# Patient Record
Sex: FEMALE | Race: WHITE | NOT HISPANIC OR LATINO | Employment: FULL TIME | URBAN - METROPOLITAN AREA
[De-identification: names, ages, dates, MRNs, and addresses within clinical notes are randomized per-mention and may not be internally consistent; named-entity substitution may affect disease eponyms.]

---

## 2023-03-13 ENCOUNTER — DOCUMENTATION ONLY (OUTPATIENT)
Dept: UROGYNECOLOGY | Facility: CLINIC | Age: 53
End: 2023-03-13

## 2023-03-30 ENCOUNTER — OFFICE VISIT (OUTPATIENT)
Dept: UROGYNECOLOGY | Facility: CLINIC | Age: 53
End: 2023-03-30
Payer: COMMERCIAL

## 2023-03-30 VITALS
HEIGHT: 64 IN | DIASTOLIC BLOOD PRESSURE: 92 MMHG | TEMPERATURE: 99 F | BODY MASS INDEX: 27.96 KG/M2 | HEART RATE: 65 BPM | WEIGHT: 163.81 LBS | RESPIRATION RATE: 18 BRPM | SYSTOLIC BLOOD PRESSURE: 134 MMHG

## 2023-03-30 DIAGNOSIS — N39.3 STRESS INCONTINENCE: ICD-10-CM

## 2023-03-30 DIAGNOSIS — N95.2 VAGINAL ATROPHY: ICD-10-CM

## 2023-03-30 DIAGNOSIS — Z96.0 HISTORY OF PUBOVAGINAL SLING: ICD-10-CM

## 2023-03-30 DIAGNOSIS — Z01.818 PREOP TESTING: ICD-10-CM

## 2023-03-30 DIAGNOSIS — N39.41 URGE INCONTINENCE: ICD-10-CM

## 2023-03-30 DIAGNOSIS — N81.9 FEMALE GENITAL PROLAPSE, UNSPECIFIED TYPE: ICD-10-CM

## 2023-03-30 DIAGNOSIS — R32 INCONTINENCE IN FEMALE: Primary | ICD-10-CM

## 2023-03-30 PROCEDURE — 3080F PR MOST RECENT DIASTOLIC BLOOD PRESSURE >= 90 MM HG: ICD-10-PCS | Mod: CPTII,S$GLB,, | Performed by: OBSTETRICS & GYNECOLOGY

## 2023-03-30 PROCEDURE — 3008F BODY MASS INDEX DOCD: CPT | Mod: CPTII,S$GLB,, | Performed by: OBSTETRICS & GYNECOLOGY

## 2023-03-30 PROCEDURE — 3080F DIAST BP >= 90 MM HG: CPT | Mod: CPTII,S$GLB,, | Performed by: OBSTETRICS & GYNECOLOGY

## 2023-03-30 PROCEDURE — 3075F PR MOST RECENT SYSTOLIC BLOOD PRESS GE 130-139MM HG: ICD-10-PCS | Mod: CPTII,S$GLB,, | Performed by: OBSTETRICS & GYNECOLOGY

## 2023-03-30 PROCEDURE — 99205 OFFICE O/P NEW HI 60 MIN: CPT | Mod: S$GLB,,, | Performed by: OBSTETRICS & GYNECOLOGY

## 2023-03-30 PROCEDURE — 3075F SYST BP GE 130 - 139MM HG: CPT | Mod: CPTII,S$GLB,, | Performed by: OBSTETRICS & GYNECOLOGY

## 2023-03-30 PROCEDURE — 99205 PR OFFICE/OUTPT VISIT, NEW, LEVL V, 60-74 MIN: ICD-10-PCS | Mod: S$GLB,,, | Performed by: OBSTETRICS & GYNECOLOGY

## 2023-03-30 PROCEDURE — 3008F PR BODY MASS INDEX (BMI) DOCUMENTED: ICD-10-PCS | Mod: CPTII,S$GLB,, | Performed by: OBSTETRICS & GYNECOLOGY

## 2023-03-30 NOTE — PROGRESS NOTES
PELVIC MEDICINE AND RECONSTRUCTIVE SURGERY CONSULT NOTE    CHIEF COMPLAINT:  Consultation requested regarding incontinence    HISTORY OF PRESENT ILLNESS:   Carol Das is a 53 y.o. female  Patient reports first noticing symptoms about 2-3 years ago.      She had a Sling placed in 2006 at the same time as her hysterectomy.  This was done with Dr. Ye in [place].  About 2-3 yrs ago she began having leakage again  When she lifts her leg, urine leaks.      Voids during the day: q1-3h  Voids at nighttime: 3-4  Leakage of urine with coughing or exercise: Yes  -previous surgery? Yes - Sling 2006  Leakage of urine with urgency: Yes - getting more and more.  Not much time from urgency to leak  Pad for leakage of urine:no   -how often do you change your pad? N/A  Sensation of incomplete emptying: No  Splinting to void/BM: No  History of kidney stones No  Hematuria No  > 3 UTIs in 12 months  No  - symptoms with UTI? N/A    Glasses/ounces of water in 1 day: 2 liters  Cups of coffee/tea/soda in 1 day: 1 coffee per week, one tea, one soda   Bowel movements in 1 week: 1-2  Constipation/straining: yes  Fecal incontinence: No  Fecal urgency: No  Fecal smearing: No    Patient  denies symptoms of a vaginal bulge.  Size of the bulge: N/A  Bulge becoming progressively worse over time: not applicable  Bulge limits physical activity: not applicable    Tried a pessary: no  Tried Kegels: No  Prior Surgery for prolapse: No  Prior Surgery for incontinence: Yes - Sling 2006    Sexually active: yes  Pain with sex: no  Vaginal dryness: yes  Loss of urine or stool with sexual activity:no    She reports history of vaginal bleeding - pre hyst  Patient also reports history of abdominal or pelvic pain.    Patient denies back injury or back pain  Patient denies lower extremity numbness, tingling      Gyn Hx:  Patient reports h/o Normal paps; denies h/o Fibroids, reports h/o Endometriosis, reports h/o Ovarian Cysts, denies h/o abnormal  paps, denies h/o LEEP/Cryo  Menopause age: 2006 Hysterectomy/Sling  No LMP recorded. Patient has had a hysterectomy.   V4  Obstetric complications? Yes - blood clots  Vaginal births? yes  -use of forceps or vacuum? no    OB History    Para Term  AB Living   4 4 4 0 0 4   SAB IAB Ectopic Multiple Live Births   0 0 0 0 4     Review of patient's allergies indicates:  No Known Allergies     Current Outpatient Medications:     albuterol (PROVENTIL/VENTOLIN HFA) 90 mcg/actuation inhaler, Inhale 2 puffs into the lungs every 4 (four) hours as needed., Disp: , Rfl:     levothyroxine (SYNTHROID) 112 MCG tablet, Take 112 mcg by mouth every morning., Disp: , Rfl:     liothyronine (CYTOMEL) 5 MCG Tab, Take 10 mcg by mouth., Disp: , Rfl:     venlafaxine (EFFEXOR-XR) 150 MG Cp24, Take 150 mg by mouth once daily., Disp: , Rfl:     Past Medical History:   Diagnosis Date    Thyroid disease     Urinary tract infection, site not specified      Past Surgical History:   Procedure Laterality Date    BLADDER REPAIR - Sling N/A 2006    HYSTERECTOMY  2006       Family History   Problem Relation Age of Onset    Asthma Mother     Arthritis Mother     Hypertension Father     Asthma Sister     Depression Sister     Mental illness Sister         Social History     Tobacco Use    Smoking status: Never    Smokeless tobacco: Never   Substance Use Topics    Alcohol use: Not Currently    Drug use: Never        Review of Systems   Psychological ROS: negative  Eyes: Negative  ENT ROS: Negative  Neuro ROS: Negative  Respiratory ROS: Negative  Gastrointestinal ROS: Negative  Cardiovascular ROS: Negative  Genitourinary ROS: Negative, Frequent urination, Difficulty emptying bladder, Leaking of urine, Difficulty starting stream - cannot stop mid stream, and Waking to urinate  Integumentary ROS: Negative  Musculoskeletal ROS: Negative      Physical Exam:   BP (!) 134/92 (BP Location: Left arm, Patient Position: Sitting, BP Method: Medium  "(Automatic))   Pulse 65   Temp 99 °F (37.2 °C) (Oral)   Resp 18   Ht 5' 4" (1.626 m)   Wt 74.3 kg (163 lb 12.8 oz)   BMI 28.12 kg/m²   General: No acute distress   Skin: no lesions  Musculoskeletal: normal lower extremity strength  Sensation to light touch in the lower extremities is normal   Extremities: well perfused with normal pulses in the distal extremities, no peripheral edema noted  Abdominal exam: soft non tender, no palpable masses  External genitalia: normal female genitalia, no lesions, normal female hair distribution, no clitoral enlargement, nontender, no scars  The perineal reflexes are present  Exam Chaperoned by: Fay Clements LPN  Vagina/cervix: atrophic vagina, no discharge, exudate, lesion, or erythema  Bimanual exam: Surgically absent, no palpable masses, non-tender exam  Urethra: no prolapse, caruncle, absent  Stress test: positive, + Valsalva    POPQ (Date 2023): Aa -1.5 Ba -1.5  C -5  GH 4 PB 2 TVL 9.5 Ap -1.5 Bp -1.5 D N/A        ASSESSMENT:  Carol Das is a 53 y.o.    1. Incontinence in female    2. Urge incontinence    3. Stress incontinence    4. History of pubovaginal sling    5. Female genital prolapse, unspecified type    6. Vaginal atrophy          PLAN:  The pathophysiology of the above condition has been discussed with the patient who expressed understanding.   We discussed the differences between stress and urge incontinence.    Urge urinary incontinence - We reviewed management options for Urge Incontinence including expectant management, lifestyle and behavioral modifications, Kegel's, PFPT, medications and more invasive treatments such as PTNS, Botox, and Interstim. We reviewed in great detail the success and failure rate associated with each approach. For Botox we discussed complication rates such as voiding dysfunction, need for self catheterization and, more rare complications of muscle weakness remote from Botox injection.  At this time, " YUSRA>>UUI    Stress urinary incontinence - We reviewed management options for Stress Incontinence including expectant management, lifestyle and behavioral modifications, weight loss, Kegel's, PFPT, Poise incontinence inserts, pessaries, and more invasive treatments such as mid-urethral and fascial slings. We reviewed in great detail the success and failure rate associated with each approach.  For slings, we discussed complication rates like mesh erosion, post op urinary retention, voiding dysfunction, pain, infection, bleeding and the expected recovery after a sling procedure.  Patient leaked with Valsalva.  She also leaks with moving her leg into certain positions.  She lifts her leg and she leaks.  She has a h/o Sling in 2006.  Most likely the sling is tethering and opening the urethra.  The sling is also not functioning any longer.  We were unable to get the operative note but per her description it is most likely a retropubic sling.  Would need to release the sling to relieve the tethering and offer urethral bulking for YUSRA.  Will schedule date for intervention at next visit.  She will need pre-op cysto/UDT for previous sling with urethral tethering and mixed incontinence.     Pelvic Organ Prolapse - the patient was counseled regarding the pathophysiology of prolapse.  The patient was counseled regarding the possible natural progression of prolapse and the clinical consequences of worsening prolapse.  We discussed that in most cases prolapse is not harmful but can negatively affect her quality of life.  She was counseled regarding management strategies including: expectant management, pelvic floor physical therapy, weight loss, Kegel's, avoidance of constipation and heavy lifting, and pessary placement.  Surgical management also reviewed.  Cure from surgical intervention is not guaranteed and there is risk of occult stress incontinence.  She verbalized understanding and desires to pursue PFPT.  Prolapse is Stage  1.  Conservative management/PFPT recommended.  Prolapse is not bothersome.    Vaginal atrophy - Encouraged use of vaginal estrogen as it will restore some elasticity to the vagina and has been shown to decrease the rate of recurrent UTIs, decrease vaginal pH, and increase vaginal lactobacillus.  A pea-sized amount to the vagina every night for 14 days, then 2-3 times per week.  Handout given on low dose vaginal estrogen.      At the time the patient desires to proceed:  PFPT  Sling release and urethral bulking  Vaginal estrogen    Handouts provided on above diagnosis    Follow up 6-8 weeks    Orders Placed This Encounter    Urinalysis, Reflex to Urine Culture    estradioL (ESTRACE) 0.01 % (0.1 mg/gram) vaginal cream         WILLIAM Barrow MD  Pelvic Medicine and Reconstructive Surgery  Urogynecology  [facility]      60 minutes was spent face to face with patient >50% of the time was spent in counseling and coordination of care.

## 2023-04-10 RX ORDER — ESTRADIOL 0.1 MG/G
CREAM VAGINAL
Qty: 42.5 G | Refills: 10 | Status: SHIPPED | OUTPATIENT
Start: 2023-04-10 | End: 2023-06-12 | Stop reason: SDUPTHER

## 2023-05-11 ENCOUNTER — OFFICE VISIT (OUTPATIENT)
Dept: UROGYNECOLOGY | Facility: CLINIC | Age: 53
End: 2023-05-11
Payer: COMMERCIAL

## 2023-05-11 DIAGNOSIS — N39.3 STRESS INCONTINENCE: ICD-10-CM

## 2023-05-11 DIAGNOSIS — N95.2 VAGINAL ATROPHY: ICD-10-CM

## 2023-05-11 DIAGNOSIS — N81.9 FEMALE GENITAL PROLAPSE, UNSPECIFIED TYPE: ICD-10-CM

## 2023-05-11 DIAGNOSIS — N39.41 URGE INCONTINENCE: ICD-10-CM

## 2023-05-11 DIAGNOSIS — R32 INCONTINENCE IN FEMALE: Primary | ICD-10-CM

## 2023-05-11 DIAGNOSIS — Z96.0 HISTORY OF PUBOVAGINAL SLING: ICD-10-CM

## 2023-05-11 PROCEDURE — 1159F MED LIST DOCD IN RCRD: CPT | Mod: CPTII,S$GLB,, | Performed by: OBSTETRICS & GYNECOLOGY

## 2023-05-11 PROCEDURE — 52000 CYSTOURETHROSCOPY: CPT | Mod: S$GLB,,, | Performed by: OBSTETRICS & GYNECOLOGY

## 2023-05-11 PROCEDURE — 99499 NO LOS: ICD-10-PCS | Mod: S$GLB,,, | Performed by: OBSTETRICS & GYNECOLOGY

## 2023-05-11 PROCEDURE — 1159F PR MEDICATION LIST DOCUMENTED IN MEDICAL RECORD: ICD-10-PCS | Mod: CPTII,S$GLB,, | Performed by: OBSTETRICS & GYNECOLOGY

## 2023-05-11 PROCEDURE — 99499 UNLISTED E&M SERVICE: CPT | Mod: S$GLB,,, | Performed by: OBSTETRICS & GYNECOLOGY

## 2023-05-11 PROCEDURE — 52000 PR CYSTOURETHROSCOPY: ICD-10-PCS | Mod: S$GLB,,, | Performed by: OBSTETRICS & GYNECOLOGY

## 2023-05-12 RX ORDER — SULFAMETHOXAZOLE AND TRIMETHOPRIM 800; 160 MG/1; MG/1
1 TABLET ORAL 2 TIMES DAILY
Qty: 3 TABLET | Refills: 0 | Status: SHIPPED | OUTPATIENT
Start: 2023-05-12 | End: 2023-05-14

## 2023-05-17 RX ORDER — LIDOCAINE HYDROCHLORIDE 20 MG/ML
JELLY TOPICAL
Status: DISCONTINUED | OUTPATIENT
Start: 2023-05-17 | End: 2023-05-20 | Stop reason: CLARIF

## 2023-05-17 NOTE — PROCEDURES
Cystoscopy     Brief Procedure Note  Name:  Carol Das  MRN: [id number]  Age: 53 y.o.   YOB: 1970  Gender: female      Procedure Date:  05/17/2023      Location:  Clinic     Pre Procedure Diagnosis:  1. Incontinence in female    2. Urge incontinence    3. Stress incontinence    4. History of pubovaginal sling    5. Female genital prolapse, unspecified type    6. Vaginal atrophy         Post-Op Diagnosis:  same    Procedure:  Flexible cystoscopy    Surgeon:   Jailyn Barrow MD    Anesthesia Type:  2% Lidocaine jelly (Urojet)    Findings:  Ureteral orifices in normal orthotopic position, no trabeculations, no tumors, no mucosal lesions, no diverticula seen.  Normal urethra and bladder.    Estimated Blood Loss:  0 ml    Fluids:  Normal saline    Complications: None    Condition:  stable    Disposition:  home    Procedure in Detail::  Patient was placed on table in dorsal lithotomy position and prepped and draped in normal sterile fashion.  A well lubricated 16 Pashto flexible cystoscope was inserted into urethral meatus and advanced into the bladder.  Care was taken to keep the lumen in the center of view.  In the urethra there were no strictures, mucosal lesions, tumors, or polyps.  The bladder was then partially filled and evaluated in a pandescopic fashion. The bilateral ureteral orifices were in normal orthotopic position. There were no mucosal lesions, trabeculations, diverticula, stones, or tumors. Overall the bladder appeared normal.   Retroflexed view showed normal urothelium at the bladder neck.  The bladder was then drained and the results briefly discussed with patient.  All parts of the cystoscopic sheath and instruments were intact removed from the patient.  Patient tolerated procedure well.  All instrument, and sponge counts were correct.  Patient was allowed to recover in the room.    WILLIAM Barrow MD  Pelvic Medicine and Reconstructive  Surgery  Urogynecology      Cystoscopy    Date/Time: 5/11/2023 3:20 PM  Performed by: Jailyn Barrow MD  Authorized by: Jailyn Barrow MD     Consent Done?:  Yes (Verbal)  Timeout: prior to procedure the correct patient, procedure, and site was verified    Prep: patient was prepped and draped in usual sterile fashion    Local anesthesia used?: Yes    Anesthesia:  Intraurethral instillation  Local anesthetic:  Topical anesthetic  Anesthetic total (ml):  5  Indications: incontinence and overactive bladder    Position:  Dorsal lithotomy  Anesthesia:  Intraurethral instillation  Patient sedated?: No    Preparation: Patient was prepped and draped in usual sterile fashion    Scope type:  Flexible cystoscope  External exam normal: Yes    Urethra normal: Yes    Bladder neck normal: Yes    Bladder normal: Yes     patient tolerated the procedure well with no immediate complications

## 2023-05-28 ENCOUNTER — PATIENT MESSAGE (OUTPATIENT)
Dept: ADMINISTRATIVE | Facility: OTHER | Age: 53
End: 2023-05-28
Payer: COMMERCIAL

## 2023-05-29 ENCOUNTER — PATIENT MESSAGE (OUTPATIENT)
Dept: ADMINISTRATIVE | Facility: OTHER | Age: 53
End: 2023-05-29
Payer: COMMERCIAL

## 2023-05-30 ENCOUNTER — HOSPITAL ENCOUNTER (OUTPATIENT)
Dept: RADIOLOGY | Facility: HOSPITAL | Age: 53
Discharge: HOME | End: 2023-05-30
Attending: OBSTETRICS & GYNECOLOGY
Payer: COMMERCIAL

## 2023-05-30 ENCOUNTER — ANESTHESIA EVENT (OUTPATIENT)
Dept: SURGERY | Facility: HOSPITAL | Age: 53
End: 2023-05-30
Payer: COMMERCIAL

## 2023-05-30 ENCOUNTER — OFFICE VISIT (OUTPATIENT)
Dept: UROGYNECOLOGY | Facility: CLINIC | Age: 53
End: 2023-05-30
Payer: COMMERCIAL

## 2023-05-30 VITALS — SYSTOLIC BLOOD PRESSURE: 128 MMHG | DIASTOLIC BLOOD PRESSURE: 88 MMHG | HEART RATE: 68 BPM

## 2023-05-30 DIAGNOSIS — R32 INCONTINENCE IN FEMALE: ICD-10-CM

## 2023-05-30 DIAGNOSIS — N39.3 STRESS INCONTINENCE: ICD-10-CM

## 2023-05-30 DIAGNOSIS — Z96.0 HISTORY OF PUBOVAGINAL SLING: ICD-10-CM

## 2023-05-30 DIAGNOSIS — N39.3 STRESS INCONTINENCE: Primary | ICD-10-CM

## 2023-05-30 DIAGNOSIS — N81.9 FEMALE GENITAL PROLAPSE, UNSPECIFIED TYPE: ICD-10-CM

## 2023-05-30 DIAGNOSIS — N95.2 VAGINAL ATROPHY: ICD-10-CM

## 2023-05-30 DIAGNOSIS — Z01.818 PREOP TESTING: ICD-10-CM

## 2023-05-30 PROCEDURE — 3074F PR MOST RECENT SYSTOLIC BLOOD PRESSURE < 130 MM HG: ICD-10-PCS | Mod: CPTII,S$GLB,, | Performed by: OBSTETRICS & GYNECOLOGY

## 2023-05-30 PROCEDURE — 51728 CYSTOMETROGRAM W/VP: CPT | Mod: 26,,, | Performed by: OBSTETRICS & GYNECOLOGY

## 2023-05-30 PROCEDURE — 3074F SYST BP LT 130 MM HG: CPT | Mod: CPTII,S$GLB,, | Performed by: OBSTETRICS & GYNECOLOGY

## 2023-05-30 PROCEDURE — 71046 X-RAY EXAM CHEST 2 VIEWS: CPT | Mod: TC

## 2023-05-30 PROCEDURE — 99499 NO LOS: ICD-10-PCS | Mod: S$GLB,,, | Performed by: OBSTETRICS & GYNECOLOGY

## 2023-05-30 PROCEDURE — 51784 PR ANAL/URINARY MUSCLE STUDY: ICD-10-PCS | Mod: 26,,, | Performed by: OBSTETRICS & GYNECOLOGY

## 2023-05-30 PROCEDURE — 51797 PR VOIDING PRESS STUDY INTRA-ABDOMINAL VOID: ICD-10-PCS | Mod: S$GLB,,, | Performed by: OBSTETRICS & GYNECOLOGY

## 2023-05-30 PROCEDURE — 51784 ANAL/URINARY MUSCLE STUDY: CPT | Mod: 26,,, | Performed by: OBSTETRICS & GYNECOLOGY

## 2023-05-30 PROCEDURE — 3079F PR MOST RECENT DIASTOLIC BLOOD PRESSURE 80-89 MM HG: ICD-10-PCS | Mod: CPTII,S$GLB,, | Performed by: OBSTETRICS & GYNECOLOGY

## 2023-05-30 PROCEDURE — 51741 ELECTRO-UROFLOWMETRY FIRST: CPT | Mod: S$GLB,,, | Performed by: OBSTETRICS & GYNECOLOGY

## 2023-05-30 PROCEDURE — 3079F DIAST BP 80-89 MM HG: CPT | Mod: CPTII,S$GLB,, | Performed by: OBSTETRICS & GYNECOLOGY

## 2023-05-30 PROCEDURE — 51797 INTRAABDOMINAL PRESSURE TEST: CPT | Mod: S$GLB,,, | Performed by: OBSTETRICS & GYNECOLOGY

## 2023-05-30 PROCEDURE — 99499 UNLISTED E&M SERVICE: CPT | Mod: S$GLB,,, | Performed by: OBSTETRICS & GYNECOLOGY

## 2023-05-30 PROCEDURE — 51728 PR COMPLEX CYSTOMETROGRAM VOIDING PRESSURE STUDIES: ICD-10-PCS | Mod: 26,,, | Performed by: OBSTETRICS & GYNECOLOGY

## 2023-05-30 PROCEDURE — 51741 PR UROFLOWMETRY, COMPLEX: ICD-10-PCS | Mod: S$GLB,,, | Performed by: OBSTETRICS & GYNECOLOGY

## 2023-05-30 RX ORDER — ACETAMINOPHEN 325 MG/1
1000 TABLET ORAL ONCE
Status: CANCELLED | OUTPATIENT
Start: 2023-05-30 | End: 2023-05-30

## 2023-05-30 RX ORDER — ONDANSETRON 2 MG/ML
4 INJECTION INTRAMUSCULAR; INTRAVENOUS
Status: CANCELLED | OUTPATIENT
Start: 2023-05-30 | End: 2023-05-30

## 2023-05-30 RX ORDER — PHENAZOPYRIDINE HYDROCHLORIDE 200 MG/1
200 TABLET, FILM COATED ORAL ONCE
Status: CANCELLED | OUTPATIENT
Start: 2023-05-30 | End: 2023-05-30

## 2023-05-30 RX ORDER — CELECOXIB 100 MG/1
400 CAPSULE ORAL ONCE
Status: CANCELLED | OUTPATIENT
Start: 2023-05-30 | End: 2023-05-30

## 2023-05-30 RX ORDER — ONDANSETRON 4 MG/1
4 TABLET, FILM COATED ORAL ONCE
Status: CANCELLED | OUTPATIENT
Start: 2023-05-30 | End: 2023-05-30

## 2023-05-30 RX ORDER — CELECOXIB 200 MG/1
200 CAPSULE ORAL
Status: CANCELLED | OUTPATIENT
Start: 2023-05-30 | End: 2023-05-30

## 2023-05-30 NOTE — H&P
PELVIC MEDICINE AND RECONSTRUCTIVE SURGERY H&P NOTE    CHIEF COMPLAINT:  Follow up regarding incontinence    HISTORY OF PRESENT ILLNESS:   Carol Das is a 53 y.o. female  Patient reports first noticing symptoms about 2-3 years ago.      She had a Sling placed in  at the same time as her hysterectomy.  This was done with Dr. Ye in [place].  About 2-3 yrs ago she began having leakage again  When she lifts her leg, urine leaks.      Voids during the day: q1-3h  Voids at nighttime: 3-4  Leakage of urine with coughing or exercise: Yes  -previous surgery? Yes - Sling   Leakage of urine with urgency: Yes - getting more and more.  Not much time from urgency to leak  Pad for leakage of urine:no   -how often do you change your pad? N/A  Sensation of incomplete emptying: No  Splinting to void/BM: No  History of kidney stones No  Hematuria No  > 3 UTIs in 12 months  No  - symptoms with UTI? N/A    Glasses/ounces of water in 1 day: 2 liters  Cups of coffee/tea/soda in 1 day: 1 coffee per week, one tea, one soda   Bowel movements in 1 week: 1-2  Constipation/straining: yes  Fecal incontinence: No  Fecal urgency: No  Fecal smearing: No    Patient  denies symptoms of a vaginal bulge.  Size of the bulge: N/A  Bulge becoming progressively worse over time: not applicable  Bulge limits physical activity: not applicable    Tried a pessary: no  Tried Kegels: No  Prior Surgery for prolapse: No  Prior Surgery for incontinence: Yes - Sling     Interim Hx:  Patient presents today for UDT and to sign consents    Gyn Hx:  Patient reports h/o Normal paps; denies h/o Fibroids, reports h/o Endometriosis, reports h/o Ovarian Cysts, denies h/o abnormal paps, denies h/o LEEP/Cryo  Menopause age: 2006 Hysterectomy/Sling  No LMP recorded. Patient has had a hysterectomy.   V4  Obstetric complications? Yes - blood clots  Vaginal births? yes  -use of forceps or vacuum? no    OB History    Para Term  AB Living    4 4 4 0 0 4   SAB IAB Ectopic Multiple Live Births   0 0 0 0 4     Review of patient's allergies indicates:  No Known Allergies     Current Outpatient Medications:     estradioL (ESTRACE) 0.01 % (0.1 mg/gram) vaginal cream, Use a pea sized amount to the vagina once a night for 14 nights when initiating the medication, then 2-3 times per week.  DO NOT use the applicator. (Patient not taking: Reported on 5/20/2023), Disp: 42.5 g, Rfl: 10    levothyroxine (SYNTHROID) 112 MCG tablet, Take 112 mcg by mouth every morning., Disp: , Rfl:     liothyronine (CYTOMEL) 5 MCG Tab, Take 10 mcg by mouth., Disp: , Rfl:     venlafaxine (EFFEXOR-XR) 150 MG Cp24, Take 150 mg by mouth once daily., Disp: , Rfl:     Past Medical History:   Diagnosis Date    Incontinence     MVP (mitral valve prolapse)     Thyroid disease     Urinary tract infection, site not specified      Past Surgical History:   Procedure Laterality Date    BLADDER REPAIR - Sling N/A 2006    HYSTERECTOMY  2006       Family History   Problem Relation Age of Onset    Asthma Mother     Arthritis Mother     Hypertension Father     Asthma Sister     Depression Sister     Mental illness Sister         Social History     Tobacco Use    Smoking status: Never    Smokeless tobacco: Never   Substance Use Topics    Alcohol use: Not Currently    Drug use: Never        Review of Systems   Psychological ROS: negative  Eyes: Negative  ENT ROS: Negative  Neuro ROS: Negative  Respiratory ROS: Negative  Gastrointestinal ROS: Negative  Cardiovascular ROS: Negative  Genitourinary ROS: Negative, Frequent urination, Difficulty emptying bladder, Leaking of urine, Difficulty starting stream - cannot stop mid stream, and Waking to urinate  Integumentary ROS: Negative  Musculoskeletal ROS: Negative      Physical Exam:   /88   Pulse 68   General: No acute distress   Skin: no lesions  Musculoskeletal: normal lower extremity strength  Chest: clear  CV: regular rate  Sensation to light touch  in the lower extremities is normal   Extremities: well perfused with normal pulses in the distal extremities, no peripheral edema noted  Abdominal exam: soft non tender, no palpable masses  External genitalia: normal female genitalia, no lesions, normal female hair distribution, no clitoral enlargement, nontender, no scars  The perineal reflexes are present  Exam Chaperoned by: Fay Clements LPN  Vagina/cervix: atrophic vagina, no discharge, exudate, lesion, or erythema  Bimanual exam: Surgically absent, no palpable masses, non-tender exam  Urethra: no prolapse, caruncle, absent  Stress test: positive, + Valsalva    POPQ (Date 2023): Aa -1.5 Ba -1.5  C -5  GH 4 PB 2 TVL 9.5 Ap -1.5 Bp -1.5 D N/A        ASSESSMENT:  Carol Das is a 53 y.o.    1. Stress incontinence    2. Incontinence in female    3. History of pubovaginal sling    4. Female genital prolapse, unspecified type    5. Vaginal atrophy          PLAN:  The pathophysiology of the above condition has been discussed with the patient who expressed understanding.   We discussed the differences between stress and urge incontinence.    Urge urinary incontinence - We reviewed management options for Urge Incontinence including expectant management, lifestyle and behavioral modifications, Kegel's, PFPT, medications and more invasive treatments such as PTNS, Botox, and Interstim. We reviewed in great detail the success and failure rate associated with each approach. For Botox we discussed complication rates such as voiding dysfunction, need for self catheterization and, more rare complications of muscle weakness remote from Botox injection.  At this time, YUSRA>>UUI.  -Bladder is stable.  No DO noted. Bladder biopsy in OR.  Small polyps noted on cystoscopy.    Stress urinary incontinence - We reviewed management options for Stress Incontinence including expectant management, lifestyle and behavioral modifications, weight loss, Kegel's, PFPT,  Poise incontinence inserts, pessaries, and more invasive treatments such as mid-urethral and fascial slings. We reviewed in great detail the success and failure rate associated with each approach.  For slings, we discussed complication rates like mesh erosion, post op urinary retention, voiding dysfunction, pain, infection, bleeding and the expected recovery after a sling procedure.  Patient leaked with Valsalva.  She also leaks with moving her leg into certain positions.  She lifts her leg and she leaks.  She has a h/o Sling in 2006.  Most likely the sling is tethering and opening the urethra.  The sling is also not functioning any longer.  We were unable to get the operative note but per her description it is most likely a retropubic sling.  Would need to release the sling to relieve the tethering and offer urethral bulking for YUSRA.  Will schedule date for intervention at next visit.  She will need pre-op cysto/UDT for previous sling with urethral tethering and mixed incontinence.     -Scheduled for urethral bulking.  Will release sling and remove segment under the urethra.  Patient counseled on the risks, benefits, and alternatives to surgery, including:  Bleeding, infection, damage to surrounding organs, including the bladder and urethra.  Patient counseled on the risk of injury to major vascular structures.  Patient counseled on the risk of conversion to open, and/or other procedures as necessary.  Patient consents to the use of blood products if it is to save her life.  Patient accepts these risks and wishes to proceed with surgery.    Pelvic Organ Prolapse - the patient was counseled regarding the pathophysiology of prolapse.  The patient was counseled regarding the possible natural progression of prolapse and the clinical consequences of worsening prolapse.  We discussed that in most cases prolapse is not harmful but can negatively affect her quality of life.  She was counseled regarding management  strategies including: expectant management, pelvic floor physical therapy, weight loss, Kegel's, avoidance of constipation and heavy lifting, and pessary placement.  Surgical management also reviewed.  Cure from surgical intervention is not guaranteed and there is risk of occult stress incontinence.  She verbalized understanding and desires to pursue PFPT.  Prolapse is Stage 1.  Conservative management/PFPT recommended.  Prolapse is not bothersome.    Vaginal atrophy - Encouraged use of vaginal estrogen as it will restore some elasticity to the vagina and has been shown to decrease the rate of recurrent UTIs, decrease vaginal pH, and increase vaginal lactobacillus.  A pea-sized amount to the vagina every night for 14 days, then 2-3 times per week.  Handout given on low dose vaginal estrogen.      At the time the patient desires to proceed:  PFPT  Sling release and urethral bulking  Continue Vaginal estrogen      Follow up post-op    WILLIAM Barrow MD  Pelvic Medicine and Reconstructive Surgery  Urogynecology  [facility]    80 minutes was spent face to face with patient >50% of the time was spent in counseling and coordination of care.

## 2023-05-30 NOTE — H&P (VIEW-ONLY)
PELVIC MEDICINE AND RECONSTRUCTIVE SURGERY H&P NOTE    CHIEF COMPLAINT:  Follow up regarding incontinence    HISTORY OF PRESENT ILLNESS:   Carol Das is a 53 y.o. female  Patient reports first noticing symptoms about 2-3 years ago.      She had a Sling placed in  at the same time as her hysterectomy.  This was done with Dr. Ye in [place].  About 2-3 yrs ago she began having leakage again  When she lifts her leg, urine leaks.      Voids during the day: q1-3h  Voids at nighttime: 3-4  Leakage of urine with coughing or exercise: Yes  -previous surgery? Yes - Sling   Leakage of urine with urgency: Yes - getting more and more.  Not much time from urgency to leak  Pad for leakage of urine:no   -how often do you change your pad? N/A  Sensation of incomplete emptying: No  Splinting to void/BM: No  History of kidney stones No  Hematuria No  > 3 UTIs in 12 months  No  - symptoms with UTI? N/A    Glasses/ounces of water in 1 day: 2 liters  Cups of coffee/tea/soda in 1 day: 1 coffee per week, one tea, one soda   Bowel movements in 1 week: 1-2  Constipation/straining: yes  Fecal incontinence: No  Fecal urgency: No  Fecal smearing: No    Patient  denies symptoms of a vaginal bulge.  Size of the bulge: N/A  Bulge becoming progressively worse over time: not applicable  Bulge limits physical activity: not applicable    Tried a pessary: no  Tried Kegels: No  Prior Surgery for prolapse: No  Prior Surgery for incontinence: Yes - Sling     Interim Hx:  Patient presents today for UDT and to sign consents    Gyn Hx:  Patient reports h/o Normal paps; denies h/o Fibroids, reports h/o Endometriosis, reports h/o Ovarian Cysts, denies h/o abnormal paps, denies h/o LEEP/Cryo  Menopause age: 2006 Hysterectomy/Sling  No LMP recorded. Patient has had a hysterectomy.   V4  Obstetric complications? Yes - blood clots  Vaginal births? yes  -use of forceps or vacuum? no    OB History    Para Term  AB Living    4 4 4 0 0 4   SAB IAB Ectopic Multiple Live Births   0 0 0 0 4     Review of patient's allergies indicates:  No Known Allergies     Current Outpatient Medications:     estradioL (ESTRACE) 0.01 % (0.1 mg/gram) vaginal cream, Use a pea sized amount to the vagina once a night for 14 nights when initiating the medication, then 2-3 times per week.  DO NOT use the applicator. (Patient not taking: Reported on 5/20/2023), Disp: 42.5 g, Rfl: 10    levothyroxine (SYNTHROID) 112 MCG tablet, Take 112 mcg by mouth every morning., Disp: , Rfl:     liothyronine (CYTOMEL) 5 MCG Tab, Take 10 mcg by mouth., Disp: , Rfl:     venlafaxine (EFFEXOR-XR) 150 MG Cp24, Take 150 mg by mouth once daily., Disp: , Rfl:     Past Medical History:   Diagnosis Date    Incontinence     MVP (mitral valve prolapse)     Thyroid disease     Urinary tract infection, site not specified      Past Surgical History:   Procedure Laterality Date    BLADDER REPAIR - Sling N/A 2006    HYSTERECTOMY  2006       Family History   Problem Relation Age of Onset    Asthma Mother     Arthritis Mother     Hypertension Father     Asthma Sister     Depression Sister     Mental illness Sister         Social History     Tobacco Use    Smoking status: Never    Smokeless tobacco: Never   Substance Use Topics    Alcohol use: Not Currently    Drug use: Never        Review of Systems   Psychological ROS: negative  Eyes: Negative  ENT ROS: Negative  Neuro ROS: Negative  Respiratory ROS: Negative  Gastrointestinal ROS: Negative  Cardiovascular ROS: Negative  Genitourinary ROS: Negative, Frequent urination, Difficulty emptying bladder, Leaking of urine, Difficulty starting stream - cannot stop mid stream, and Waking to urinate  Integumentary ROS: Negative  Musculoskeletal ROS: Negative      Physical Exam:   /88   Pulse 68   General: No acute distress   Skin: no lesions  Musculoskeletal: normal lower extremity strength  Chest: clear  CV: regular rate  Sensation to light touch  in the lower extremities is normal   Extremities: well perfused with normal pulses in the distal extremities, no peripheral edema noted  Abdominal exam: soft non tender, no palpable masses  External genitalia: normal female genitalia, no lesions, normal female hair distribution, no clitoral enlargement, nontender, no scars  The perineal reflexes are present  Exam Chaperoned by: Fay Clements LPN  Vagina/cervix: atrophic vagina, no discharge, exudate, lesion, or erythema  Bimanual exam: Surgically absent, no palpable masses, non-tender exam  Urethra: no prolapse, caruncle, absent  Stress test: positive, + Valsalva    POPQ (Date 2023): Aa -1.5 Ba -1.5  C -5  GH 4 PB 2 TVL 9.5 Ap -1.5 Bp -1.5 D N/A        ASSESSMENT:  Carol Das is a 53 y.o.    1. Stress incontinence    2. Incontinence in female    3. History of pubovaginal sling    4. Female genital prolapse, unspecified type    5. Vaginal atrophy          PLAN:  The pathophysiology of the above condition has been discussed with the patient who expressed understanding.   We discussed the differences between stress and urge incontinence.    Urge urinary incontinence - We reviewed management options for Urge Incontinence including expectant management, lifestyle and behavioral modifications, Kegel's, PFPT, medications and more invasive treatments such as PTNS, Botox, and Interstim. We reviewed in great detail the success and failure rate associated with each approach. For Botox we discussed complication rates such as voiding dysfunction, need for self catheterization and, more rare complications of muscle weakness remote from Botox injection.  At this time, YUSRA>>UUI.  -Bladder is stable.  No DO noted. Bladder biopsy in OR.  Small polyps noted on cystoscopy.    Stress urinary incontinence - We reviewed management options for Stress Incontinence including expectant management, lifestyle and behavioral modifications, weight loss, Kegel's, PFPT,  Poise incontinence inserts, pessaries, and more invasive treatments such as mid-urethral and fascial slings. We reviewed in great detail the success and failure rate associated with each approach.  For slings, we discussed complication rates like mesh erosion, post op urinary retention, voiding dysfunction, pain, infection, bleeding and the expected recovery after a sling procedure.  Patient leaked with Valsalva.  She also leaks with moving her leg into certain positions.  She lifts her leg and she leaks.  She has a h/o Sling in 2006.  Most likely the sling is tethering and opening the urethra.  The sling is also not functioning any longer.  We were unable to get the operative note but per her description it is most likely a retropubic sling.  Would need to release the sling to relieve the tethering and offer urethral bulking for YUSRA.  Will schedule date for intervention at next visit.  She will need pre-op cysto/UDT for previous sling with urethral tethering and mixed incontinence.     -Scheduled for urethral bulking.  Will release sling and remove segment under the urethra.  Patient counseled on the risks, benefits, and alternatives to surgery, including:  Bleeding, infection, damage to surrounding organs, including the bladder and urethra.  Patient counseled on the risk of injury to major vascular structures.  Patient counseled on the risk of conversion to open, and/or other procedures as necessary.  Patient consents to the use of blood products if it is to save her life.  Patient accepts these risks and wishes to proceed with surgery.    Pelvic Organ Prolapse - the patient was counseled regarding the pathophysiology of prolapse.  The patient was counseled regarding the possible natural progression of prolapse and the clinical consequences of worsening prolapse.  We discussed that in most cases prolapse is not harmful but can negatively affect her quality of life.  She was counseled regarding management  strategies including: expectant management, pelvic floor physical therapy, weight loss, Kegel's, avoidance of constipation and heavy lifting, and pessary placement.  Surgical management also reviewed.  Cure from surgical intervention is not guaranteed and there is risk of occult stress incontinence.  She verbalized understanding and desires to pursue PFPT.  Prolapse is Stage 1.  Conservative management/PFPT recommended.  Prolapse is not bothersome.    Vaginal atrophy - Encouraged use of vaginal estrogen as it will restore some elasticity to the vagina and has been shown to decrease the rate of recurrent UTIs, decrease vaginal pH, and increase vaginal lactobacillus.  A pea-sized amount to the vagina every night for 14 days, then 2-3 times per week.  Handout given on low dose vaginal estrogen.      At the time the patient desires to proceed:  PFPT  Sling release and urethral bulking  Continue Vaginal estrogen      Follow up post-op    WILLIAM Barrow MD  Pelvic Medicine and Reconstructive Surgery  Urogynecology  [facility]    80 minutes was spent face to face with patient >50% of the time was spent in counseling and coordination of care.

## 2023-05-30 NOTE — ANESTHESIA PREPROCEDURE EVALUATION
05/30/2023  Carol Das is a 53 y.o., female , who presents for the following:     Procedures:        REVISION, PUBOVAGINAL SLING (Perineum)       CYSTOSCOPY, WITH PERIURETHRAL BULKING AGENT INJECTION  2 boxes of Bulkamid and scope - 2 boxes of Bulkamid and scope   Anesthesia type: General   Diagnosis:        Incontinence in female [R32]       Stress incontinence [N39.3]       History of pubovaginal sling [Z96.0]   Pre-op diagnosis:        Incontinence in female [R32]       Stress incontinence [N39.3]       History of pubovaginal sling [Z96.0]   Location: [facility] OR [address] [place] [address] OR   Surgeons: Jailyn Barrow MD       Pre-op Assessment    I have reviewed the Patient Summary Reports.     I have reviewed the Nursing Notes. I have reviewed the NPO Status.   I have reviewed the Medications.     Review of Systems  Anesthesia Hx:  No problems with previous Anesthesia  Denies Family Hx of Anesthesia complications.   Denies Personal Hx of Anesthesia complications.   Social:  Non-Smoker    Cardiovascular:   MVP   Pulmonary:  Pulmonary Normal    Hepatic/GI:  Hepatic/GI Normal    Endocrine:   Hypothyroidism        Physical Exam  General: Alert and Oriented    Airway:  Mallampati: III   Mouth Opening: Normal  TM Distance: Normal  Tongue: Normal  Neck ROM: Normal ROM    Dental:  Intact    Chest/Lungs:  Normal Respiratory Rate    Heart:  Rate: Normal  Rhythm: Regular Rhythm        05/30 1144    HGB 12.9       WBC 6.03       Platelets 321              K+ 4.5       Creatinine 0.77       Glucose 74          CXR : NAF        Anesthesia Plan  Type of Anesthesia, risks & benefits discussed:    Anesthesia Type: Gen ETT  Intra-op Monitoring Plan: Standard ASA Monitors  Post Op Pain Control Plan: IV/PO Opioids PRN and multimodal analgesia  Induction:  IV  Airway Plan: Direct, Post-Induction  Informed Consent: Informed  consent signed with the Patient and all parties understand the risks and agree with anesthesia plan.  All questions answered. Patient consented to blood products? No  ASA Score: 2  Day of Surgery Review of History & Physical: H&P Update referred to the surgeon/provider.  Anesthesia Plan Notes: ERAS    Ready For Surgery From Anesthesia Perspective.     .

## 2023-05-31 ENCOUNTER — ANESTHESIA (OUTPATIENT)
Dept: SURGERY | Facility: HOSPITAL | Age: 53
End: 2023-05-31
Payer: COMMERCIAL

## 2023-05-31 ENCOUNTER — PATIENT MESSAGE (OUTPATIENT)
Dept: ADMINISTRATIVE | Facility: OTHER | Age: 53
End: 2023-05-31
Payer: COMMERCIAL

## 2023-05-31 ENCOUNTER — HOSPITAL ENCOUNTER (OUTPATIENT)
Facility: HOSPITAL | Age: 53
Discharge: HOME | End: 2023-05-31
Attending: OBSTETRICS & GYNECOLOGY | Admitting: OBSTETRICS & GYNECOLOGY
Payer: COMMERCIAL

## 2023-05-31 DIAGNOSIS — R32 INCONTINENCE IN FEMALE: ICD-10-CM

## 2023-05-31 DIAGNOSIS — Z96.0 HISTORY OF PUBOVAGINAL SLING: ICD-10-CM

## 2023-05-31 DIAGNOSIS — N39.3 STRESS INCONTINENCE: ICD-10-CM

## 2023-05-31 LAB
ABORH RETYPE: NORMAL
GROUP & RH: NORMAL
INDIRECT COOMBS GEL: NORMAL
SPECIMEN OUTDATE: NORMAL

## 2023-05-31 PROCEDURE — 63600175 PHARM REV CODE 636 W HCPCS: Performed by: ANESTHESIOLOGY

## 2023-05-31 PROCEDURE — 25000003 PHARM REV CODE 250

## 2023-05-31 PROCEDURE — 86900 BLOOD TYPING SEROLOGIC ABO: CPT | Performed by: OBSTETRICS & GYNECOLOGY

## 2023-05-31 PROCEDURE — 71000016 HC POSTOP RECOV ADDL HR: Performed by: OBSTETRICS & GYNECOLOGY

## 2023-05-31 PROCEDURE — 36000707: Performed by: OBSTETRICS & GYNECOLOGY

## 2023-05-31 PROCEDURE — 25000003 PHARM REV CODE 250: Performed by: OBSTETRICS & GYNECOLOGY

## 2023-05-31 PROCEDURE — D9220A PRA ANESTHESIA: ICD-10-PCS | Mod: CRNA,,, | Performed by: NURSE ANESTHETIST, CERTIFIED REGISTERED

## 2023-05-31 PROCEDURE — 25000003 PHARM REV CODE 250: Performed by: ANESTHESIOLOGY

## 2023-05-31 PROCEDURE — 25000003 PHARM REV CODE 250: Performed by: NURSE ANESTHETIST, CERTIFIED REGISTERED

## 2023-05-31 PROCEDURE — L8606 SYNTHETIC IMPLNT URINARY 1ML: HCPCS | Performed by: OBSTETRICS & GYNECOLOGY

## 2023-05-31 PROCEDURE — 37000008 HC ANESTHESIA 1ST 15 MINUTES: Performed by: OBSTETRICS & GYNECOLOGY

## 2023-05-31 PROCEDURE — 51702 INSERT TEMP BLADDER CATH: CPT | Performed by: OBSTETRICS & GYNECOLOGY

## 2023-05-31 PROCEDURE — D9220A PRA ANESTHESIA: Mod: ANES,,, | Performed by: ANESTHESIOLOGY

## 2023-05-31 PROCEDURE — 51715 ENDOSCOPIC INJECTION/IMPLANT: CPT | Mod: 51,,, | Performed by: OBSTETRICS & GYNECOLOGY

## 2023-05-31 PROCEDURE — 71000015 HC POSTOP RECOV 1ST HR: Performed by: OBSTETRICS & GYNECOLOGY

## 2023-05-31 PROCEDURE — 71000039 HC RECOVERY, EACH ADD'L HOUR: Performed by: OBSTETRICS & GYNECOLOGY

## 2023-05-31 PROCEDURE — 53500 URETHRLYS TRANSVAG W/ SCOPE: CPT | Mod: ,,, | Performed by: OBSTETRICS & GYNECOLOGY

## 2023-05-31 PROCEDURE — 53500 PR URETHRLYS, TRANSVAG W/ SCOPE: ICD-10-PCS | Mod: ,,, | Performed by: OBSTETRICS & GYNECOLOGY

## 2023-05-31 PROCEDURE — 51715 PR ENDOSCOPIC INJECTION/IMPLANT: ICD-10-PCS | Mod: 51,,, | Performed by: OBSTETRICS & GYNECOLOGY

## 2023-05-31 PROCEDURE — 27201423 OPTIME MED/SURG SUP & DEVICES STERILE SUPPLY: Performed by: OBSTETRICS & GYNECOLOGY

## 2023-05-31 PROCEDURE — 52204 PR CYSTOURETHROSCOPY,BIOPSY: ICD-10-PCS | Mod: 59,,, | Performed by: OBSTETRICS & GYNECOLOGY

## 2023-05-31 PROCEDURE — 52204 CYSTOSCOPY W/BIOPSY(S): CPT | Mod: 59,,, | Performed by: OBSTETRICS & GYNECOLOGY

## 2023-05-31 PROCEDURE — D9220A PRA ANESTHESIA: Mod: CRNA,,, | Performed by: NURSE ANESTHETIST, CERTIFIED REGISTERED

## 2023-05-31 PROCEDURE — 71000033 HC RECOVERY, INTIAL HOUR: Performed by: OBSTETRICS & GYNECOLOGY

## 2023-05-31 PROCEDURE — 36000706: Performed by: OBSTETRICS & GYNECOLOGY

## 2023-05-31 PROCEDURE — 51798 US URINE CAPACITY MEASURE: CPT | Performed by: OBSTETRICS & GYNECOLOGY

## 2023-05-31 PROCEDURE — 63600175 PHARM REV CODE 636 W HCPCS: Performed by: NURSE ANESTHETIST, CERTIFIED REGISTERED

## 2023-05-31 PROCEDURE — 63600175 PHARM REV CODE 636 W HCPCS: Performed by: OBSTETRICS & GYNECOLOGY

## 2023-05-31 PROCEDURE — D9220A PRA ANESTHESIA: ICD-10-PCS | Mod: ANES,,, | Performed by: ANESTHESIOLOGY

## 2023-05-31 PROCEDURE — 37000009 HC ANESTHESIA EA ADD 15 MINS: Performed by: OBSTETRICS & GYNECOLOGY

## 2023-05-31 RX ORDER — IBUPROFEN 600 MG/1
TABLET ORAL
Status: DISCONTINUED
Start: 2023-05-31 | End: 2023-05-31 | Stop reason: HOSPADM

## 2023-05-31 RX ORDER — ACETAMINOPHEN 325 MG/1
650 TABLET ORAL EVERY 6 HOURS
Qty: 30 TABLET | Refills: 0 | Status: SHIPPED | OUTPATIENT
Start: 2023-05-31 | End: 2023-06-03

## 2023-05-31 RX ORDER — ACETAMINOPHEN 500 MG
1000 TABLET ORAL
Status: COMPLETED | OUTPATIENT
Start: 2023-05-31 | End: 2023-05-31

## 2023-05-31 RX ORDER — MORPHINE SULFATE 4 MG/ML
2 INJECTION, SOLUTION INTRAMUSCULAR; INTRAVENOUS EVERY 4 HOURS PRN
Status: CANCELLED | OUTPATIENT
Start: 2023-05-31

## 2023-05-31 RX ORDER — ROCURONIUM BROMIDE 10 MG/ML
INJECTION, SOLUTION INTRAVENOUS
Status: DISCONTINUED | OUTPATIENT
Start: 2023-05-31 | End: 2023-05-31

## 2023-05-31 RX ORDER — DEXAMETHASONE SODIUM PHOSPHATE 4 MG/ML
INJECTION, SOLUTION INTRA-ARTICULAR; INTRALESIONAL; INTRAMUSCULAR; INTRAVENOUS; SOFT TISSUE
Status: DISCONTINUED | OUTPATIENT
Start: 2023-05-31 | End: 2023-05-31

## 2023-05-31 RX ORDER — EPHEDRINE SULFATE 50 MG/ML
INJECTION, SOLUTION INTRAVENOUS
Status: DISCONTINUED | OUTPATIENT
Start: 2023-05-31 | End: 2023-05-31

## 2023-05-31 RX ORDER — ACETAMINOPHEN 325 MG/1
650 TABLET ORAL EVERY 6 HOURS
Status: CANCELLED | OUTPATIENT
Start: 2023-05-31

## 2023-05-31 RX ORDER — BUPIVACAINE HYDROCHLORIDE 2.5 MG/ML
INJECTION, SOLUTION EPIDURAL; INFILTRATION; INTRACAUDAL
Status: DISCONTINUED | OUTPATIENT
Start: 2023-05-31 | End: 2023-05-31 | Stop reason: HOSPADM

## 2023-05-31 RX ORDER — MIDAZOLAM HYDROCHLORIDE 1 MG/ML
2 INJECTION INTRAMUSCULAR; INTRAVENOUS ONCE AS NEEDED
Status: COMPLETED | OUTPATIENT
Start: 2023-05-31 | End: 2023-05-31

## 2023-05-31 RX ORDER — FENTANYL CITRATE 50 UG/ML
INJECTION, SOLUTION INTRAMUSCULAR; INTRAVENOUS
Status: DISCONTINUED | OUTPATIENT
Start: 2023-05-31 | End: 2023-05-31

## 2023-05-31 RX ORDER — LIDOCAINE HYDROCHLORIDE 10 MG/ML
INJECTION, SOLUTION EPIDURAL; INFILTRATION; INTRACAUDAL; PERINEURAL
Status: DISCONTINUED | OUTPATIENT
Start: 2023-05-31 | End: 2023-05-31

## 2023-05-31 RX ORDER — ONDANSETRON 2 MG/ML
4 INJECTION INTRAMUSCULAR; INTRAVENOUS EVERY 8 HOURS PRN
Status: CANCELLED | OUTPATIENT
Start: 2023-05-31

## 2023-05-31 RX ORDER — DIPHENHYDRAMINE HYDROCHLORIDE 50 MG/ML
12.5 INJECTION INTRAMUSCULAR; INTRAVENOUS EVERY 4 HOURS PRN
Status: CANCELLED | OUTPATIENT
Start: 2023-05-31

## 2023-05-31 RX ORDER — LIDOCAINE HYDROCHLORIDE 20 MG/ML
JELLY TOPICAL
Status: DISCONTINUED
Start: 2023-05-31 | End: 2023-05-31 | Stop reason: WASHOUT

## 2023-05-31 RX ORDER — EPINEPHRINE 1 MG/ML
INJECTION, SOLUTION, CONCENTRATE INTRAVENOUS
Status: DISCONTINUED
Start: 2023-05-31 | End: 2023-05-31 | Stop reason: HOSPADM

## 2023-05-31 RX ORDER — BUPIVACAINE HYDROCHLORIDE 2.5 MG/ML
INJECTION, SOLUTION EPIDURAL; INFILTRATION; INTRACAUDAL
Status: DISCONTINUED
Start: 2023-05-31 | End: 2023-05-31 | Stop reason: HOSPADM

## 2023-05-31 RX ORDER — ONDANSETRON 4 MG/1
TABLET, ORALLY DISINTEGRATING ORAL
Status: COMPLETED
Start: 2023-05-31 | End: 2023-05-31

## 2023-05-31 RX ORDER — IBUPROFEN 600 MG/1
600 TABLET ORAL EVERY 6 HOURS
Status: DISCONTINUED | OUTPATIENT
Start: 2023-05-31 | End: 2023-05-31 | Stop reason: HOSPADM

## 2023-05-31 RX ORDER — PHENAZOPYRIDINE HYDROCHLORIDE 200 MG/1
200 TABLET, FILM COATED ORAL ONCE
Status: COMPLETED | OUTPATIENT
Start: 2023-05-31 | End: 2023-05-31

## 2023-05-31 RX ORDER — ONDANSETRON HYDROCHLORIDE 2 MG/ML
INJECTION, SOLUTION INTRAMUSCULAR; INTRAVENOUS
Status: DISCONTINUED | OUTPATIENT
Start: 2023-05-31 | End: 2023-05-31

## 2023-05-31 RX ORDER — PROPOFOL 10 MG/ML
VIAL (ML) INTRAVENOUS
Status: DISCONTINUED | OUTPATIENT
Start: 2023-05-31 | End: 2023-05-31

## 2023-05-31 RX ORDER — GABAPENTIN 300 MG/1
300 CAPSULE ORAL
Status: COMPLETED | OUTPATIENT
Start: 2023-05-31 | End: 2023-05-31

## 2023-05-31 RX ORDER — IBUPROFEN 600 MG/1
600 TABLET ORAL EVERY 6 HOURS
Qty: 30 TABLET | Refills: 0 | Status: SHIPPED | OUTPATIENT
Start: 2023-05-31 | End: 2023-06-03

## 2023-05-31 RX ORDER — CEFAZOLIN SODIUM 2 G/50ML
2 SOLUTION INTRAVENOUS ONCE
Status: DISCONTINUED | OUTPATIENT
Start: 2023-05-31 | End: 2023-05-31 | Stop reason: HOSPADM

## 2023-05-31 RX ORDER — ONDANSETRON 2 MG/ML
4 INJECTION INTRAMUSCULAR; INTRAVENOUS ONCE AS NEEDED
Status: DISCONTINUED | OUTPATIENT
Start: 2023-05-31 | End: 2023-05-31 | Stop reason: HOSPADM

## 2023-05-31 RX ORDER — ONDANSETRON HYDROCHLORIDE 4 MG/5ML
4 SOLUTION ORAL ONCE
Status: DISCONTINUED | OUTPATIENT
Start: 2023-05-31 | End: 2023-05-31 | Stop reason: HOSPADM

## 2023-05-31 RX ORDER — SODIUM CHLORIDE, SODIUM LACTATE, POTASSIUM CHLORIDE, CALCIUM CHLORIDE 600; 310; 30; 20 MG/100ML; MG/100ML; MG/100ML; MG/100ML
INJECTION, SOLUTION INTRAVENOUS CONTINUOUS
Status: DISCONTINUED | OUTPATIENT
Start: 2023-05-31 | End: 2023-05-31 | Stop reason: HOSPADM

## 2023-05-31 RX ORDER — TRAMADOL HYDROCHLORIDE 50 MG/1
50 TABLET ORAL EVERY 6 HOURS PRN
Qty: 12 TABLET | Refills: 0 | Status: SHIPPED | OUTPATIENT
Start: 2023-05-31

## 2023-05-31 RX ORDER — KETOROLAC TROMETHAMINE 10 MG/1
10 TABLET, FILM COATED ORAL EVERY 6 HOURS
Status: CANCELLED | OUTPATIENT
Start: 2023-05-31 | End: 2023-06-05

## 2023-05-31 RX ORDER — IBUPROFEN 200 MG
400 TABLET ORAL EVERY 6 HOURS
Status: DISCONTINUED | OUTPATIENT
Start: 2023-05-31 | End: 2023-05-31

## 2023-05-31 RX ORDER — CELECOXIB 200 MG/1
400 CAPSULE ORAL ONCE
Status: COMPLETED | OUTPATIENT
Start: 2023-05-31 | End: 2023-05-31

## 2023-05-31 RX ORDER — LIDOCAINE HYDROCHLORIDE 10 MG/ML
1 INJECTION, SOLUTION EPIDURAL; INFILTRATION; INTRACAUDAL; PERINEURAL ONCE
Status: DISCONTINUED | OUTPATIENT
Start: 2023-05-31 | End: 2023-05-31 | Stop reason: HOSPADM

## 2023-05-31 RX ORDER — HYDROMORPHONE HYDROCHLORIDE 2 MG/ML
0.4 INJECTION, SOLUTION INTRAMUSCULAR; INTRAVENOUS; SUBCUTANEOUS EVERY 5 MIN PRN
Status: DISCONTINUED | OUTPATIENT
Start: 2023-05-31 | End: 2023-05-31 | Stop reason: HOSPADM

## 2023-05-31 RX ORDER — CEFAZOLIN SODIUM 1 G/3ML
INJECTION, POWDER, FOR SOLUTION INTRAMUSCULAR; INTRAVENOUS
Status: DISCONTINUED | OUTPATIENT
Start: 2023-05-31 | End: 2023-05-31

## 2023-05-31 RX ORDER — CEFAZOLIN 2 G/1
INJECTION, POWDER, FOR SOLUTION INTRAMUSCULAR; INTRAVENOUS
Status: COMPLETED
Start: 2023-05-31 | End: 2023-05-31

## 2023-05-31 RX ORDER — EPINEPHRINE 0.1 MG/ML
INJECTION INTRAVENOUS
Status: DISCONTINUED | OUTPATIENT
Start: 2023-05-31 | End: 2023-05-31 | Stop reason: HOSPADM

## 2023-05-31 RX ADMIN — SODIUM CHLORIDE, POTASSIUM CHLORIDE, SODIUM LACTATE AND CALCIUM CHLORIDE: 600; 310; 30; 20 INJECTION, SOLUTION INTRAVENOUS at 11:05

## 2023-05-31 RX ADMIN — PROPOFOL 150 MG: 10 INJECTION, EMULSION INTRAVENOUS at 07:05

## 2023-05-31 RX ADMIN — MIDAZOLAM HYDROCHLORIDE 2 MG: 1 INJECTION, SOLUTION INTRAMUSCULAR; INTRAVENOUS at 07:05

## 2023-05-31 RX ADMIN — IBUPROFEN 600 MG: 600 TABLET ORAL at 04:05

## 2023-05-31 RX ADMIN — FENTANYL CITRATE 50 MCG: 50 INJECTION, SOLUTION INTRAMUSCULAR; INTRAVENOUS at 08:05

## 2023-05-31 RX ADMIN — ONDANSETRON 4 MG: 2 INJECTION INTRAMUSCULAR; INTRAVENOUS at 11:05

## 2023-05-31 RX ADMIN — HYDROMORPHONE HYDROCHLORIDE 0.4 MG: 2 INJECTION INTRAMUSCULAR; INTRAVENOUS; SUBCUTANEOUS at 11:05

## 2023-05-31 RX ADMIN — PROPOFOL 50 MG: 10 INJECTION, EMULSION INTRAVENOUS at 09:05

## 2023-05-31 RX ADMIN — SUGAMMADEX 200 MG: 100 INJECTION, SOLUTION INTRAVENOUS at 11:05

## 2023-05-31 RX ADMIN — CEFAZOLIN 2 G: 330 INJECTION, POWDER, FOR SOLUTION INTRAMUSCULAR; INTRAVENOUS at 08:05

## 2023-05-31 RX ADMIN — GABAPENTIN 300 MG: 300 CAPSULE ORAL at 06:05

## 2023-05-31 RX ADMIN — ROCURONIUM BROMIDE 40 MG: 50 INJECTION INTRAVENOUS at 07:05

## 2023-05-31 RX ADMIN — LIDOCAINE HYDROCHLORIDE 50 MG: 10 INJECTION, SOLUTION EPIDURAL; INFILTRATION; INTRACAUDAL; PERINEURAL at 07:05

## 2023-05-31 RX ADMIN — SODIUM CHLORIDE, POTASSIUM CHLORIDE, SODIUM LACTATE AND CALCIUM CHLORIDE: 600; 310; 30; 20 INJECTION, SOLUTION INTRAVENOUS at 06:05

## 2023-05-31 RX ADMIN — ACETAMINOPHEN 1000 MG: 500 TABLET, FILM COATED ORAL at 06:05

## 2023-05-31 RX ADMIN — FENTANYL CITRATE 50 MCG: 50 INJECTION, SOLUTION INTRAMUSCULAR; INTRAVENOUS at 07:05

## 2023-05-31 RX ADMIN — ONDANSETRON 4 MG: 4 TABLET, ORALLY DISINTEGRATING ORAL at 06:05

## 2023-05-31 RX ADMIN — EPHEDRINE SULFATE 10 MG: 50 INJECTION INTRAVENOUS at 09:05

## 2023-05-31 RX ADMIN — DEXAMETHASONE SODIUM PHOSPHATE 4 MG: 4 INJECTION, SOLUTION INTRA-ARTICULAR; INTRALESIONAL; INTRAMUSCULAR; INTRAVENOUS; SOFT TISSUE at 08:05

## 2023-05-31 RX ADMIN — SODIUM CHLORIDE, POTASSIUM CHLORIDE, SODIUM LACTATE AND CALCIUM CHLORIDE: 600; 310; 30; 20 INJECTION, SOLUTION INTRAVENOUS at 09:05

## 2023-05-31 RX ADMIN — EPHEDRINE SULFATE 10 MG: 50 INJECTION INTRAVENOUS at 08:05

## 2023-05-31 RX ADMIN — PHENAZOPYRIDINE 200 MG: 200 TABLET ORAL at 06:05

## 2023-05-31 RX ADMIN — CELECOXIB 400 MG: 200 CAPSULE ORAL at 06:05

## 2023-05-31 NOTE — PROGRESS NOTES
No incision, all surgery done internal, dwight pad noted dry and in place.  Vaginal packing noted

## 2023-05-31 NOTE — DISCHARGE INSTRUCTIONS
Patient Education       Cystoscopy Discharge Instructions      What care is needed at home?   Take a warm bath or use a warm wet washcloth over the opening to the urethra. This will help to ease any pain. Do this as needed.  Drink 6 to 8 glasses of water a day and 3 to 4 glasses in the first few hours after the procedure to flush out your bladder and reduce irritation.  You may see some blood in your urine for a few days. This is normal.  Empty your bladder as soon as you feel the need to. Don't delay going to the bathroom. It stretches and weakens the bladder.    What follow-up care is needed?   Be sure to keep your follow up visit.  If you had a biopsy, talk with your doctor about the results.    Will physical activity be limited?   Talk to your doctor about when you may go back to your normal activities like work, driving, or sex.    What problems could happen?   Bleeding  Infection  Injury to the bladder and urethra  Discomfort in the urethra area  Burning sensation for a short time  Upset stomach    When do I need to call the doctor?   Signs of infection. These include a fever of 100.4°F (38°C) or higher, chills, pain with passing urine.  Pain that does not go away even with drugs or that lasts longer than 2 days  Too much blood in your urine  Passing large dime-sized clots  Cloudy urine  Little or no urine or not able to pass urine  Abdominal pain and nausea

## 2023-05-31 NOTE — TRANSFER OF CARE
"Anesthesia Transfer of Care Note    Patient: Carol Das    Procedure(s) Performed: Procedure(s) (LRB):  CYSTOSCOPY, WITH PERIURETHRAL BULKING AGENT INJECTION  2 boxes of Bulkamid and scope (N/A)  urethrolysis  intraoperative ultrasound (N/A)  BIOPSY, BLADDER    Patient location: PACU    Anesthesia Type: general    Transport from OR: Transported from OR on room air with adequate spontaneous ventilation    Post pain: adequate analgesia    Post assessment: no apparent anesthetic complications    Post vital signs: stable    Level of consciousness: responds to stimulation    Nausea/Vomiting: no nausea/vomiting    Complications: none    Transfer of care protocol was followed      Last vitals:   Visit Vitals  BP (!) 143/85   Pulse 61   Temp 36.8 °C (98.2 °F) (Oral)   Resp 20   Ht 5' 4" (1.626 m)   Wt 75.6 kg (166 lb 10.7 oz)   SpO2 100%   Breastfeeding No   BMI 28.61 kg/m²     " 8

## 2023-05-31 NOTE — ANESTHESIA POSTPROCEDURE EVALUATION
Anesthesia Post Evaluation    Patient: Carol Das    Procedure(s) Performed: Procedure(s) (LRB):  CYSTOSCOPY, WITH PERIURETHRAL BULKING AGENT INJECTION  2 boxes of Bulkamid and scope (N/A)  urethrolysis  intraoperative ultrasound (N/A)  BIOPSY, BLADDER    Final Anesthesia Type: general      Patient location during evaluation: PACU  Patient participation: Yes- Able to Participate  Level of consciousness: oriented and awake  Post-procedure vital signs: reviewed and stable  Pain management: adequate  Airway patency: patent    PONV status at discharge: No PONV  Anesthetic complications: no      Cardiovascular status: hemodynamically stable  Respiratory status: spontaneous ventilation and unassisted  Hydration status: euvolemic  Follow-up not needed.  Comments: [facility]          Vitals Value Taken Time   /72 05/31/23 1221   Temp 36.4 °C (97.5 °F) 05/31/23 1131   Pulse 83 05/31/23 1230   Resp 17 05/31/23 1230   SpO2 97 % 05/31/23 1230         No case tracking events are documented in the log.      Pain/Gael Score: Pain Rating Prior to Med Admin: 6 (5/31/2023 11:49 AM)  Gael Score: 9 (5/31/2023 12:30 PM)         No

## 2023-05-31 NOTE — PLAN OF CARE
Pt met discharge home criteria. Melton care discussed, all questions and concerns addressed. Pt assisted with dressing, able to ambulate to the w/c. Pt escorted to the vehicle via w/c, pt's  is driving.

## 2023-05-31 NOTE — INTERVAL H&P NOTE
The patient has been examined and the H&P has been reviewed:    I concur with the findings and no changes have occurred since H&P was written.    Surgery risks, benefits and alternative options discussed and understood by patient/family.    Jailyn Barrow MD      There are no hospital problems to display for this patient.

## 2023-05-31 NOTE — ANESTHESIA PROCEDURE NOTES
Intubation    Date/Time: 5/31/2023 8:00 AM  Performed by: Yahaira Ribeiro CRNA  Authorized by: Jeffrey Perez MD     Intubation:     Induction:  Intravenous    Intubated:  Postinduction    Mask Ventilation:  Easy mask    Attempts:  1    Attempted By:  CRNA    Blade:  Adhikari 2    Laryngeal View Grade: Grade I - full view of cords      Difficult Airway Encountered?: No      Complications:  None    Airway Device:  Oral endotracheal tube    Airway Device Size:  6.5    Style/Cuff Inflation:  Cuffed (inflated to minimal occlusive pressure)    Tube secured:  21    Secured at:  The lips    Placement Verified By:  Capnometry    Complicating Factors:  None    Findings Post-Intubation:  BS equal bilateral

## 2023-05-31 NOTE — OP NOTE
[facility] Surgical - Periop Services  General Surgery  Operative Note    SUMMARY     Date of Procedure: 5/31/2023     Procedure: Procedure(s) (LRB):  CYSTOSCOPY, WITH PERIURETHRAL BULKING AGENT INJECTION  2 boxes of Bulkamid and scope (N/A)  urethrolysis  intraoperative ultrasound (N/A)  BIOPSY, BLADDER       Surgeon(s) and Role:     * Jailyn Barrow MD - Primary    Assisting Surgeon: None    Pre-Operative Diagnosis: Incontinence in female [R32]  Stress incontinence [N39.3]  History of pubovaginal sling [Z96.0]    Post-Operative Diagnosis: Post-Op Diagnosis Codes:     * Incontinence in female [R32]     * Stress incontinence [N39.3]     * History of pubovaginal sling [Z96.0]    Anesthesia: General    Operative Findings (including complications, if any): Patient gives history of mesh mid-urethral sling placed in 2006 during her hysterectomy. She has tethering of the urethra and incontinence with movement of her leg. No mesh mid-urethral sling located intra-op. Scar tissue tethering the urethra was noted and incised.    Description of Technical Procedures: The patient was identified in the holding area and informed consent was confirmed. She was taken back to the operating room, where an adequate level of general anesthesia was obtained. The patient was then placed in dorsal lithotomy position, with careful attention to the position and padding of all joint surfaces, as well as making sure there was no hyperextension or hyperflexion of the joints to avoid muscular or neurological damage. A surgical time-out was completed in which the patient, as well as the procedure, was identified and  confirmed. Intravenous antibiotics were given preoperatively and SCD placed on bilateral lower extremity. Examination under anesthesia was performed, with findings as noted above. The patient's vagina, perineum and abdomen were prepped and draped in the usual sterile fashion. A 14Fr Melton catheter was placed in the bladder for  drainage.  The tissue was then infiltrated with 0.25% Marcaine with epinephrine.  An inverted U incision was then made in the vaginal epithelium.  The fornices of the vagina were palpated for the sling.  The vaginal epithelium was then reflected down with both sharp and blunt dissection.  The dissection began in the right and we were unable to locate or palpate any mesh segments.  We then turned to the left side and were unable to palpate or locate any mesh segments.  An ultrasound was brought in to possibly locate the mesh.  The ultrasound was placed directly onto the urethra and the suburethral segment of the sling was unable to be located.  This was then carried out laterally and we were unable to locate the mesh.  We then used the larger probe to look translabial for the mesh, and were unable to locate any mesh segments.  A urethrolysis was then performed to relieve the tethering on the urethra noted on previous exam.  The retropubic space was then entered with a curved Gonzales scissor, this was repeated on the contralateral side.  The underside of the pubic bone was palpated and no mesh segments were identified bilaterally. A band of scar tissue was noted traveling from the bladder neck to the left pubic ramus.  This was incised on the distal end near the pubic ramus.  On the right, another band of scar tissue was noted and incised in a similar fashion.  Cystoscopy was then performed by 1st distending the bladder with 250 cubic centimeters of sterile water, a 70 degree cystourethroscope was inserted and the evaluation commenced by performing sequential passes starting at the dome of the bladder and moving towards the trigone evaluating all areas of the bladder for lesions or injury. The bilateral ureteral orifices were visualized and noted to have efflux of urine  indicating patency. A bladder biopsy was then performed.  Patient with several clear small vesicles/polyps noted on the base of the bladder wall.   Biopsy performed with biopsy forceps near midline at the bladder base, avoiding the trigone.  Bugby cautery used to biopsy site.  Minimal bleeding noted. The cystoscope was then withdrawn after the biopsy and confirming that there was no injury. The genao catheter was then replaced and the bladder was drained.  Attention was then turned towards the urethral bulking portion of the procedure. The A 0 degree cystourethroscope on a Bulkamid 22Fr sheath was then inserted into the urethra. The urethral lumen was sloped and slightly dilated 5-8mm before the bladder neck. The needle was primed the Bulkamid hydrogel was injected at the 7 and 5 o'clock position with coaptation noted, then again at the 11 and 2 o'clock positions. Good coaptation of the urethra was noted.  There was no bleeding noted.  The scope was then removed from the urethra the bladder drained of fluid with a 10Fr catheter.  The catheter was left in place.    Patient tolerated procedure well.  All instrument, needle, and sponge counts were correct.  She was cleaned, dried, and taken to the PACU in good condition.    Significant Surgical Tasks Conducted by the Assistant(s), if Applicable: none    Estimated Blood Loss (EBL):100 mL           Implants:   Implant Name Type Inv. Item Serial No.  Lot No. LRB No. Used Action   BULKAMID KIT     [id number] N/A 2 Implanted       Specimens:   Specimen (24h ago, onward)       Start     Ordered    05/31/23 1020  Specimen to Pathology  RELEASE UPON ORDERING        Comments: Specimen A: Small polyp     References:    Click here for ordering Quick Tip   Question:  Release to patient  Answer:  Immediate    05/31/23 1020                            Condition: Good    Disposition: PACU - hemodynamically stable.    Attestation: I performed the procedure.    Jailyn Barrow MD

## 2023-06-01 ENCOUNTER — OFFICE VISIT (OUTPATIENT)
Dept: UROGYNECOLOGY | Facility: CLINIC | Age: 53
End: 2023-06-01
Payer: COMMERCIAL

## 2023-06-01 DIAGNOSIS — N39.3 STRESS INCONTINENCE: ICD-10-CM

## 2023-06-01 DIAGNOSIS — Z98.890 POST-OPERATIVE STATE: Primary | ICD-10-CM

## 2023-06-01 DIAGNOSIS — Z96.0 HISTORY OF PUBOVAGINAL SLING: ICD-10-CM

## 2023-06-01 DIAGNOSIS — N95.2 VAGINAL ATROPHY: ICD-10-CM

## 2023-06-01 PROCEDURE — 99024 POSTOP FOLLOW-UP VISIT: CPT | Mod: S$GLB,,, | Performed by: OBSTETRICS & GYNECOLOGY

## 2023-06-01 PROCEDURE — 99024 PR POST-OP FOLLOW-UP VISIT: ICD-10-PCS | Mod: S$GLB,,, | Performed by: OBSTETRICS & GYNECOLOGY

## 2023-06-01 NOTE — PROGRESS NOTES
PELVIC MEDICINE AND RECONSTRUCTIVE SURGERY    Chief complaint: Post op void trial    Subjective: Carol Das is a 53 y.o. female Patient is s/p   CYSTOSCOPY, WITH PERIURETHRAL BULKING AGENT INJECTION  2 boxes of Bulkamid and scope (N/A)  URETHROLYSIS  INTRAOPERATIVE ULTRASOUND (N/A)  BIOPSY, BLADDER      on 05/31/2023 at [facility] for bothersome YUSRA/URETHRAL TETHERING  Procedure was uncomplicated.     Patient reports:   Pain: No.  Using Tramadol  Vaginal bleeding: No  Requiring narcotics:  Using narcotics only due to instructions she was given post op  Fever/Chills: No  Nausea/Vomiting: No  Voiding: No  Constipation: No  Tolerating po: Yes  Ambulating: Yes  Back to daily activities: No  Vaginal bulge: No  Urinary incontinence: No    Pathology reviewed: FINAL DIAGNOSIS       BLADDER BIOPSY SMALL POLYP:       MINUTE FRAGMENT OF BENIGN FIBROADIPOSE TISSUE.       NO UROTHELIAL MUCOSA IDENTIFIED.       MUSCULARIS PROPRIA (DETRUSOR MUSCLE) NOT IDENTIFIED.         Exam:  There were no vitals taken for this visit.  General: No acute distress   Skin: no lesions  Musculoskeletal: normal lower extremity strength  Sensation to light touch in the lower extremities is normal   Extremities: well perfused with normal pulses in the distal extremities, no peripheral edema noted  Abdominal exam: soft non tender, no palpable masses, no scar  External genitalia: normal female genitalia, no lesions, normal female hair distribution, no clitoral enlargement, nontender, no scars  Chaperone: Fay Clements LPN  Speculum exam:   Vagina: normal vagina, no discharge or lesions, sutures present  BME: Non-tender, no masses, sutures present  Ext: no edema. Ambulating well.    Void trial:  Bag detached and bladder back filled with 300cc of normal saline.  The balloon was deflated and catheter removed without difficulty.  Patient went to void - 325 mL noted in hat  PVR 0 mL     Assessment: Carol Das is a 53 y.o. female s/p    CYSTOSCOPY, WITH PERIURETHRAL BULKING AGENT INJECTION  2 boxes of Bulkamid and scope (N/A)  URETHROLYSIS  INTRAOPERATIVE ULTRASOUND (N/A)  BIOPSY, BLADDER      on 05/31/2023 at [facility] for bothersome YUSRA/URETHRAL TETHERING     doing well and recovering appropriately 1 days post op     1. Post-operative state    2. Stress incontinence    3. History of pubovaginal sling    4. Vaginal atrophy         Plan:   Passed voiding trial  RTC for 2 week follow up      No orders of the defined types were placed in this encounter.     WILLIAM Barrow MD  Pelvic Medicine and Reconstructive Surgery  Urogynecology  [facility]

## 2023-06-02 ENCOUNTER — TELEPHONE (OUTPATIENT)
Dept: UROGYNECOLOGY | Facility: CLINIC | Age: 53
End: 2023-06-02
Payer: COMMERCIAL

## 2023-06-02 VITALS
RESPIRATION RATE: 16 BRPM | HEART RATE: 69 BPM | WEIGHT: 166.69 LBS | HEIGHT: 64 IN | OXYGEN SATURATION: 100 % | BODY MASS INDEX: 28.46 KG/M2 | DIASTOLIC BLOOD PRESSURE: 70 MMHG | TEMPERATURE: 98 F | SYSTOLIC BLOOD PRESSURE: 124 MMHG

## 2023-06-02 LAB — PSYCHE PATHOLOGY RESULT: NORMAL

## 2023-06-02 NOTE — TELEPHONE ENCOUNTER
Spoke with patient, informed her of her pathology report. Dr Barrow stated it was normal. Patient was relieved, I let her know to call if she needed anything. She verbalized understanding.

## 2023-06-06 NOTE — PROGRESS NOTES
PELVIC MEDICINE AND RECONSTRUCTIVE SURGERY FOLLOW UP NOTE     CHIEF COMPLAINT:  Follow up regarding incontinence     HISTORY OF PRESENT ILLNESS:   Carol aDs is a 53 y.o. female  Patient reports first noticing symptoms about 2-3 years ago.      She had a Sling placed in  at the same time as her hysterectomy.  This was done with Dr. Ye in [place].  About 2-3 yrs ago she began having leakage again  When she lifts her leg, urine leaks.       Voids during the day: q1-3h  Voids at nighttime: 3-4  Leakage of urine with coughing or exercise: Yes  -previous surgery? Yes - Sling 2006  Leakage of urine with urgency: Yes - getting more and more.  Not much time from urgency to leak  Pad for leakage of urine:no              -how often do you change your pad? N/A  Sensation of incomplete emptying: No  Splinting to void/BM: No  History of kidney stones No  Hematuria No  > 3 UTIs in 12 months  No  - symptoms with UTI? N/A     Glasses/ounces of water in 1 day: 2 liters  Cups of coffee/tea/soda in 1 day: 1 coffee per week, one tea, one soda   Bowel movements in 1 week: 1-2  Constipation/straining: yes  Fecal incontinence: No  Fecal urgency: No  Fecal smearing: No     Patient  denies symptoms of a vaginal bulge.  Size of the bulge: N/A  Bulge becoming progressively worse over time: not applicable  Bulge limits physical activity: not applicable     Tried a pessary: no  Tried Kegels: No  Prior Surgery for prolapse: No  Prior Surgery for incontinence: Yes - Sling      Interim Hx:  Patient presents today for UDT and to sign consents     Gyn Hx:  Patient reports h/o Normal paps; denies h/o Fibroids, reports h/o Endometriosis, reports h/o Ovarian Cysts, denies h/o abnormal paps, denies h/o LEEP/Cryo  Menopause age: 2006 Hysterectomy/Sling  No LMP recorded. Patient has had a hysterectomy.   V4  Obstetric complications? Yes - blood clots  Vaginal births? yes  -use of forceps or vacuum? no             OB History     Para Term  AB Living   4 4 4 0 0 4   SAB IAB Ectopic Multiple Live Births    0 0 0 0 4       Review of patient's allergies indicates:  No Known Allergies      Current Outpatient Medications:     estradioL (ESTRACE) 0.01 % (0.1 mg/gram) vaginal cream, Use a pea sized amount to the vagina once a night for 14 nights when initiating the medication, then 2-3 times per week.  DO NOT use the applicator. (Patient not taking: Reported on 2023), Disp: 42.5 g, Rfl: 10    levothyroxine (SYNTHROID) 112 MCG tablet, Take 112 mcg by mouth every morning., Disp: , Rfl:     liothyronine (CYTOMEL) 5 MCG Tab, Take 10 mcg by mouth., Disp: , Rfl:     venlafaxine (EFFEXOR-XR) 150 MG Cp24, Take 150 mg by mouth once daily., Disp: , Rfl:           Past Medical History:   Diagnosis Date    Incontinence      MVP (mitral valve prolapse)      Thyroid disease      Urinary tract infection, site not specified              Past Surgical History:   Procedure Laterality Date    BLADDER REPAIR - Sling N/A 2006    HYSTERECTOMY   2006               Family History   Problem Relation Age of Onset    Asthma Mother      Arthritis Mother      Hypertension Father      Asthma Sister      Depression Sister      Mental illness Sister           Social History           Tobacco Use    Smoking status: Never    Smokeless tobacco: Never   Substance Use Topics    Alcohol use: Not Currently    Drug use: Never         Review of Systems   Psychological ROS: negative  Eyes: Negative  ENT ROS: Negative  Neuro ROS: Negative  Respiratory ROS: Negative  Gastrointestinal ROS: Negative  Cardiovascular ROS: Negative  Genitourinary ROS: Negative, Frequent urination, Difficulty emptying bladder, Leaking of urine, Difficulty starting stream - cannot stop mid stream, and Waking to urinate  Integumentary ROS: Negative  Musculoskeletal ROS: Negative        Physical Exam:   /88   Pulse 68   General: No acute distress   Skin: no lesions  Musculoskeletal:  normal lower extremity strength  Chest: clear  CV: regular rate  Sensation to light touch in the lower extremities is normal   Extremities: well perfused with normal pulses in the distal extremities, no peripheral edema noted  Abdominal exam: soft non tender, no palpable masses  External genitalia: normal female genitalia, no lesions, normal female hair distribution, no clitoral enlargement, nontender, no scars  The perineal reflexes are present  Exam Chaperoned by: Fay Clements LPN  Vagina/cervix: atrophic vagina, no discharge, exudate, lesion, or erythema  Bimanual exam: Surgically absent, no palpable masses, non-tender exam  Urethra: no prolapse, caruncle, absent  Stress test: positive, + Valsalva     POPQ (Date 2023): Aa -1.5 Ba -1.5  C -5  GH 4 PB 2 TVL 9.5 Ap -1.5 Bp -1.5 D N/A          ASSESSMENT:  Carol Das is a 53 y.o.    1. Stress incontinence    2. Incontinence in female    3. History of pubovaginal sling    4. Female genital prolapse, unspecified type    5. Vaginal atrophy          PLAN:  The pathophysiology of the above condition has been discussed with the patient who expressed understanding.   We discussed the differences between stress and urge incontinence.     Urge urinary incontinence - We reviewed management options for Urge Incontinence including expectant management, lifestyle and behavioral modifications, Kegel's, PFPT, medications and more invasive treatments such as PTNS, Botox, and Interstim. We reviewed in great detail the success and failure rate associated with each approach. For Botox we discussed complication rates such as voiding dysfunction, need for self catheterization and, more rare complications of muscle weakness remote from Botox injection.  At this time, YUSRA>>UUI.  -Bladder is stable.  No DO noted. Bladder biopsy in OR.  Small polyps noted on cystoscopy.     Stress urinary incontinence - We reviewed management options for Stress Incontinence  including expectant management, lifestyle and behavioral modifications, weight loss, Kegel's, PFPT, Poise incontinence inserts, pessaries, and more invasive treatments such as mid-urethral and fascial slings. We reviewed in great detail the success and failure rate associated with each approach.  For slings, we discussed complication rates like mesh erosion, post op urinary retention, voiding dysfunction, pain, infection, bleeding and the expected recovery after a sling procedure.  Patient leaked with Valsalva.  She also leaks with moving her leg into certain positions.  She lifts her leg and she leaks.  She has a h/o Sling in 2006.  Most likely the sling is tethering and opening the urethra.  The sling is also not functioning any longer.  We were unable to get the operative note but per her description it is most likely a retropubic sling.  Would need to release the sling to relieve the tethering and offer urethral bulking for YUSRA.  Will schedule date for intervention at next visit.  She will need pre-op cysto/UDT for previous sling with urethral tethering and mixed incontinence.      -Scheduled for urethral bulking.  Will release sling and remove segment under the urethra.  Patient counseled on the risks, benefits, and alternatives to surgery, including:  Bleeding, infection, damage to surrounding organs, including the bladder and urethra.  Patient counseled on the risk of injury to major vascular structures.  Patient counseled on the risk of conversion to open, and/or other procedures as necessary.  Patient consents to the use of blood products if it is to save her life.  Patient accepts these risks and wishes to proceed with surgery.     Pelvic Organ Prolapse - the patient was counseled regarding the pathophysiology of prolapse.  The patient was counseled regarding the possible natural progression of prolapse and the clinical consequences of worsening prolapse.  We discussed that in most cases prolapse is not  harmful but can negatively affect her quality of life.  She was counseled regarding management strategies including: expectant management, pelvic floor physical therapy, weight loss, Kegel's, avoidance of constipation and heavy lifting, and pessary placement.  Surgical management also reviewed.  Cure from surgical intervention is not guaranteed and there is risk of occult stress incontinence.  She verbalized understanding and desires to pursue PFPT.  Prolapse is Stage 1.  Conservative management/PFPT recommended.  Prolapse is not bothersome.     Vaginal atrophy - Encouraged use of vaginal estrogen as it will restore some elasticity to the vagina and has been shown to decrease the rate of recurrent UTIs, decrease vaginal pH, and increase vaginal lactobacillus.  A pea-sized amount to the vagina every night for 14 days, then 2-3 times per week.  Handout given on low dose vaginal estrogen.        At the time the patient desires to proceed:  PFPT  Sling release and urethral bulking  Continue Vaginal estrogen        Follow up post-op     WILLIAM Barrow MD  Pelvic Medicine and Reconstructive Surgery  Urogynecology  [facility]     80 minutes was spent face to face with patient >50% of the time was spent in counseling and coordination of care.

## 2023-06-08 NOTE — DISCHARGE SUMMARY
[facility] Surgical - Periop Services  Discharge Note  Short Stay    Procedure(s) (LRB):  CYSTOSCOPY, WITH PERIURETHRAL BULKING AGENT INJECTION  2 boxes of Bulkamid and scope (N/A)  urethrolysis  intraoperative ultrasound (N/A)  BIOPSY, BLADDER      OUTCOME: Patient tolerated treatment/procedure well without complication and is now ready for discharge.    DISPOSITION: Home or Self Care    FINAL DIAGNOSIS:  Stress urinary incontinence    FOLLOWUP: In clinic    DISCHARGE INSTRUCTIONS:  No discharge procedures on file.      Clinical Reference Documents Added to Patient Instructions         Document    HOW TO CARE FOR YOUR WALKER CATHETER (ENGLISH)            TIME SPENT ON DISCHARGE: 10 minutes

## 2023-06-12 ENCOUNTER — OFFICE VISIT (OUTPATIENT)
Dept: UROGYNECOLOGY | Facility: CLINIC | Age: 53
End: 2023-06-12
Payer: COMMERCIAL

## 2023-06-12 VITALS — DIASTOLIC BLOOD PRESSURE: 95 MMHG | HEART RATE: 63 BPM | SYSTOLIC BLOOD PRESSURE: 118 MMHG

## 2023-06-12 DIAGNOSIS — Z96.0 HISTORY OF PUBOVAGINAL SLING: ICD-10-CM

## 2023-06-12 DIAGNOSIS — N95.2 VAGINAL ATROPHY: ICD-10-CM

## 2023-06-12 DIAGNOSIS — N39.3 STRESS INCONTINENCE: ICD-10-CM

## 2023-06-12 DIAGNOSIS — N81.9 FEMALE GENITAL PROLAPSE, UNSPECIFIED TYPE: ICD-10-CM

## 2023-06-12 DIAGNOSIS — R32 INCONTINENCE IN FEMALE: ICD-10-CM

## 2023-06-12 DIAGNOSIS — Z98.890 POST-OPERATIVE STATE: Primary | ICD-10-CM

## 2023-06-12 PROCEDURE — 99024 POSTOP FOLLOW-UP VISIT: CPT | Mod: S$GLB,,, | Performed by: OBSTETRICS & GYNECOLOGY

## 2023-06-12 PROCEDURE — 3074F PR MOST RECENT SYSTOLIC BLOOD PRESSURE < 130 MM HG: ICD-10-PCS | Mod: CPTII,S$GLB,, | Performed by: OBSTETRICS & GYNECOLOGY

## 2023-06-12 PROCEDURE — 3080F DIAST BP >= 90 MM HG: CPT | Mod: CPTII,S$GLB,, | Performed by: OBSTETRICS & GYNECOLOGY

## 2023-06-12 PROCEDURE — 99024 PR POST-OP FOLLOW-UP VISIT: ICD-10-PCS | Mod: S$GLB,,, | Performed by: OBSTETRICS & GYNECOLOGY

## 2023-06-12 PROCEDURE — 1159F MED LIST DOCD IN RCRD: CPT | Mod: CPTII,S$GLB,, | Performed by: OBSTETRICS & GYNECOLOGY

## 2023-06-12 PROCEDURE — 3080F PR MOST RECENT DIASTOLIC BLOOD PRESSURE >= 90 MM HG: ICD-10-PCS | Mod: CPTII,S$GLB,, | Performed by: OBSTETRICS & GYNECOLOGY

## 2023-06-12 PROCEDURE — 3074F SYST BP LT 130 MM HG: CPT | Mod: CPTII,S$GLB,, | Performed by: OBSTETRICS & GYNECOLOGY

## 2023-06-12 PROCEDURE — 1159F PR MEDICATION LIST DOCUMENTED IN MEDICAL RECORD: ICD-10-PCS | Mod: CPTII,S$GLB,, | Performed by: OBSTETRICS & GYNECOLOGY

## 2023-06-12 RX ORDER — ESTRADIOL 0.1 MG/G
CREAM VAGINAL
Qty: 42.5 G | Refills: 10 | Status: SHIPPED | OUTPATIENT
Start: 2023-06-12

## 2023-06-12 RX ORDER — ESTRADIOL 0.1 MG/G
CREAM VAGINAL DAILY
Status: CANCELLED | OUTPATIENT
Start: 2023-06-12 | End: 2024-06-11

## 2023-06-12 NOTE — PROGRESS NOTES
PELVIC MEDICINE AND RECONSTRUCTIVE SURGERY    Chief complaint: Post op check 2 weeks    Subjective: Carol Das is a 53 y.o. female Patient is s/p   CYSTOSCOPY, WITH PERIURETHRAL BULKING AGENT INJECTION (N/A)  URETHROLYSIS  INTRAOPERATIVE ULTRASOUND (N/A)  BIOPSY, BLADDER      on 05/31/2023 at [facility] for bothersome POP/YUSRA/history of PUBOVAGINAL SLING  Procedure was uncomplicated.     Patient reports:   Pain: No  Vaginal bleeding: Yes. Yesterday after BM.  Spotting, fresh blood.  Only once and has since stopped  Requiring narcotics: No  Fever/Chills: No  Nausea/Vomiting: No  Voiding: Yes  Constipation: No  Tolerating po: Yes  Ambulating: Yes  Back to daily activities: No  Vaginal bulge: No  Urinary incontinence: No    Pathology reviewed:   FINAL DIAGNOSIS       BLADDER BIOPSY SMALL POLYP:       MINUTE FRAGMENT OF BENIGN FIBROADIPOSE TISSUE.       NO UROTHELIAL MUCOSA IDENTIFIED.       MUSCULARIS PROPRIA (DETRUSOR MUSCLE) NOT IDENTIFIED.       POLYP, CLINICALLY.     Exam:  BP (!) 118/95   Pulse 63   General: No acute distress   Skin: no lesions  Musculoskeletal: normal lower extremity strength  Sensation to light touch in the lower extremities is normal   Extremities: well perfused with normal pulses in the distal extremities, no peripheral edema noted  Abdominal exam: soft non tender, no palpable masses, no scar  External genitalia: normal female genitalia, no lesions, normal female hair distribution, no clitoral enlargement, nontender, no scars  Chaperone: Fay Clements LPN  Speculum exam:   Vagina: normal vagina, no discharge or lesions, sutures present  BME: Non-tender, no masses, sutures present  Ext: no edema. Ambulating well.    Assessment: Carol Das is a 53 y.o. female s/p   CYSTOSCOPY, WITH PERIURETHRAL BULKING AGENT INJECTION (N/A)  URETHROLYSIS  INTRAOPERATIVE ULTRASOUND (N/A)  BIOPSY, BLADDER      on 05/31/2023 at [facility] for bothersome POP/YUSRA/h/o TOT sling   doing well and  recovering appropriately 12 days post op     1. Post-operative state    2. Stress incontinence    3. Female genital prolapse, unspecified type    4. History of pubovaginal sling    5. Incontinence in female    6. Vaginal atrophy         Plan:     Patient doing well.    She is able to urinate normally now.  No leaking.  Patient to start vaginal estrogen creme.    Small area at the posterior fourchette looks like it  during the BM.  Not an area where suture is present.     Orders Placed This Encounter    estradioL (ESTRACE) 0.01 % (0.1 mg/gram) vaginal cream       WILLIAM Barrow MD  Pelvic Medicine and Reconstructive Surgery  Urogynecology  [facility]

## 2023-06-12 NOTE — LETTER
June 12, 2023      [facility]  [address], [address]  [place] [address]  Phone: [phone]  Fax: [phone]       Patient: Carol Das   YOB: 1970  Date of Visit: 06/12/2023    To Whom It May Concern:    Lance Das  was at [facility] on 06/12/2023. The patient may return to work/school on 07/03/2023 with lifting and bending restrictions. No lifting greater than 5-10 lbs for 4 weeks.  No bending for 4 weeks.  If you have any questions or concerns, or if I can be of further assistance, please do not hesitate to contact me.    Sincerely,    Jailyn Barrow MD

## 2023-06-12 NOTE — LETTER
June 12, 2023      [facility]  [address], [address]  [place] [address]  Phone: [phone]  Fax: [phone]       Patient: Carol Das   YOB: 1970  Date of Visit: 06/12/2023    To Whom It May Concern:    Lance Das  was at [facility] on 06/12/2023. The patient may return to work/school on 06/14/2023 with no restrictions. If you have any questions or concerns, or if I can be of further assistance, please do not hesitate to contact me.    Sincerely,    Jailyn Barrow MD

## 2023-07-17 ENCOUNTER — OFFICE VISIT (OUTPATIENT)
Dept: UROGYNECOLOGY | Facility: CLINIC | Age: 53
End: 2023-07-17
Payer: COMMERCIAL

## 2023-07-17 VITALS — DIASTOLIC BLOOD PRESSURE: 97 MMHG | SYSTOLIC BLOOD PRESSURE: 134 MMHG | HEART RATE: 89 BPM

## 2023-07-17 DIAGNOSIS — Z96.0 HISTORY OF PUBOVAGINAL SLING: ICD-10-CM

## 2023-07-17 DIAGNOSIS — Z98.890 POST-OPERATIVE STATE: Primary | ICD-10-CM

## 2023-07-17 DIAGNOSIS — N95.2 VAGINAL ATROPHY: ICD-10-CM

## 2023-07-17 DIAGNOSIS — R32 INCONTINENCE IN FEMALE: ICD-10-CM

## 2023-07-17 DIAGNOSIS — N81.9 FEMALE GENITAL PROLAPSE, UNSPECIFIED TYPE: ICD-10-CM

## 2023-07-17 DIAGNOSIS — N39.3 STRESS INCONTINENCE: ICD-10-CM

## 2023-07-17 PROCEDURE — 3075F SYST BP GE 130 - 139MM HG: CPT | Mod: CPTII,S$GLB,, | Performed by: OBSTETRICS & GYNECOLOGY

## 2023-07-17 PROCEDURE — 3080F PR MOST RECENT DIASTOLIC BLOOD PRESSURE >= 90 MM HG: ICD-10-PCS | Mod: CPTII,S$GLB,, | Performed by: OBSTETRICS & GYNECOLOGY

## 2023-07-17 PROCEDURE — 99024 POSTOP FOLLOW-UP VISIT: CPT | Mod: S$GLB,,, | Performed by: OBSTETRICS & GYNECOLOGY

## 2023-07-17 PROCEDURE — 1159F PR MEDICATION LIST DOCUMENTED IN MEDICAL RECORD: ICD-10-PCS | Mod: CPTII,S$GLB,, | Performed by: OBSTETRICS & GYNECOLOGY

## 2023-07-17 PROCEDURE — 3075F PR MOST RECENT SYSTOLIC BLOOD PRESS GE 130-139MM HG: ICD-10-PCS | Mod: CPTII,S$GLB,, | Performed by: OBSTETRICS & GYNECOLOGY

## 2023-07-17 PROCEDURE — 3080F DIAST BP >= 90 MM HG: CPT | Mod: CPTII,S$GLB,, | Performed by: OBSTETRICS & GYNECOLOGY

## 2023-07-17 PROCEDURE — 1159F MED LIST DOCD IN RCRD: CPT | Mod: CPTII,S$GLB,, | Performed by: OBSTETRICS & GYNECOLOGY

## 2023-07-17 PROCEDURE — 99024 PR POST-OP FOLLOW-UP VISIT: ICD-10-PCS | Mod: S$GLB,,, | Performed by: OBSTETRICS & GYNECOLOGY

## 2023-07-17 NOTE — PROGRESS NOTES
PELVIC MEDICINE AND RECONSTRUCTIVE SURGERY    Chief complaint: Post op check 7 weeks    Subjective: Carol Das is a 53 y.o. female Patient is s/p   CYSTOSCOPY, WITH PERIURETHRAL BULKING AGENT INJECTION (N/A)  URETHROLYSIS  INTRAOPERATIVE ULTRASOUND (N/A)  BIOPSY, BLADDER      on 05/31/2023 at [facility] for bothersome POP/YUSRA/history of PUBOVAGINAL SLING  Procedure was uncomplicated.     No complaints    Patient reports:   Pain: No  Vaginal bleeding: No  Requiring narcotics: No  Fever/Chills: No  Nausea/Vomiting: No  Voiding: Yes  Constipation: No  Tolerating po: Yes  Ambulating: Yes  Back to daily activities: No  Vaginal bulge: No  Urinary incontinence: No    Pathology reviewed:   FINAL DIAGNOSIS       BLADDER BIOPSY SMALL POLYP:       MINUTE FRAGMENT OF BENIGN FIBROADIPOSE TISSUE.       NO UROTHELIAL MUCOSA IDENTIFIED.       MUSCULARIS PROPRIA (DETRUSOR MUSCLE) NOT IDENTIFIED.       POLYP, CLINICALLY.     Exam:  BP (!) 134/97   Pulse 89   General: No acute distress   Skin: no lesions  Musculoskeletal: normal lower extremity strength  Sensation to light touch in the lower extremities is normal   Extremities: well perfused with normal pulses in the distal extremities, no peripheral edema noted  Abdominal exam: soft non tender, no palpable masses, no scar  External genitalia: normal female genitalia, no lesions, normal female hair distribution, no clitoral enlargement, nontender, no scars  Chaperone: Fay Clements LPN  Speculum exam:   Vagina: normal vagina, no discharge or lesions, sutures absent  BME: Non-tender, no masses, sutures absent  Ext: no edema. Ambulating well.    Assessment: Carol Das is a 53 y.o. female s/p   CYSTOSCOPY, WITH PERIURETHRAL BULKING AGENT INJECTION (N/A)  URETHROLYSIS  INTRAOPERATIVE ULTRASOUND (N/A)  BIOPSY, BLADDER      on 05/31/2023 at [facility] for bothersome POP/YUSRA/h/o TOT sling   doing well and recovering appropriately 7 weeks post op     1. Post-operative state     2. Stress incontinence    3. Female genital prolapse, unspecified type    4. History of pubovaginal sling    5. Incontinence in female    6. Vaginal atrophy      Plan:     Patient doing well.    She continues to be able to urinate normally.  No leaking.  She can now stop mid-stream  Continue use of vaginal estrogen creme.      WILLIAM Barrow MD  Pelvic Medicine and Reconstructive Surgery  Urogynecology  [facility]

## 2023-08-28 ENCOUNTER — OFFICE VISIT (OUTPATIENT)
Dept: UROGYNECOLOGY | Facility: CLINIC | Age: 53
End: 2023-08-28
Payer: COMMERCIAL

## 2023-08-28 VITALS — DIASTOLIC BLOOD PRESSURE: 86 MMHG | HEART RATE: 91 BPM | SYSTOLIC BLOOD PRESSURE: 124 MMHG

## 2023-08-28 DIAGNOSIS — Z98.890 POST-OPERATIVE STATE: Primary | ICD-10-CM

## 2023-08-28 DIAGNOSIS — N95.2 VAGINAL ATROPHY: ICD-10-CM

## 2023-08-28 DIAGNOSIS — N39.3 STRESS INCONTINENCE: ICD-10-CM

## 2023-08-28 DIAGNOSIS — N81.9 FEMALE GENITAL PROLAPSE, UNSPECIFIED TYPE: ICD-10-CM

## 2023-08-28 PROCEDURE — 51798 US URINE CAPACITY MEASURE: CPT | Mod: S$GLB,,, | Performed by: OBSTETRICS & GYNECOLOGY

## 2023-08-28 PROCEDURE — 99024 POSTOP FOLLOW-UP VISIT: CPT | Mod: S$GLB,,, | Performed by: OBSTETRICS & GYNECOLOGY

## 2023-08-28 PROCEDURE — 3079F PR MOST RECENT DIASTOLIC BLOOD PRESSURE 80-89 MM HG: ICD-10-PCS | Mod: CPTII,S$GLB,, | Performed by: OBSTETRICS & GYNECOLOGY

## 2023-08-28 PROCEDURE — 3074F SYST BP LT 130 MM HG: CPT | Mod: CPTII,S$GLB,, | Performed by: OBSTETRICS & GYNECOLOGY

## 2023-08-28 PROCEDURE — 1159F MED LIST DOCD IN RCRD: CPT | Mod: CPTII,S$GLB,, | Performed by: OBSTETRICS & GYNECOLOGY

## 2023-08-28 PROCEDURE — 99024 PR POST-OP FOLLOW-UP VISIT: ICD-10-PCS | Mod: S$GLB,,, | Performed by: OBSTETRICS & GYNECOLOGY

## 2023-08-28 PROCEDURE — 3074F PR MOST RECENT SYSTOLIC BLOOD PRESSURE < 130 MM HG: ICD-10-PCS | Mod: CPTII,S$GLB,, | Performed by: OBSTETRICS & GYNECOLOGY

## 2023-08-28 PROCEDURE — 51798 PR MEAS,POST-VOID RES,US,NON-IMAGING: ICD-10-PCS | Mod: S$GLB,,, | Performed by: OBSTETRICS & GYNECOLOGY

## 2023-08-28 PROCEDURE — 1159F PR MEDICATION LIST DOCUMENTED IN MEDICAL RECORD: ICD-10-PCS | Mod: CPTII,S$GLB,, | Performed by: OBSTETRICS & GYNECOLOGY

## 2023-08-28 PROCEDURE — 3079F DIAST BP 80-89 MM HG: CPT | Mod: CPTII,S$GLB,, | Performed by: OBSTETRICS & GYNECOLOGY

## 2023-08-28 NOTE — PROGRESS NOTES
PELVIC MEDICINE AND RECONSTRUCTIVE SURGERY    Chief complaint: Post op check 12 weeks    Subjective: Carol Das is a 53 y.o. female Patient is s/p   CYSTOSCOPY, WITH PERIURETHRAL BULKING AGENT INJECTION (N/A)  URETHROLYSIS  INTRAOPERATIVE ULTRASOUND (N/A)  BIOPSY, BLADDER      on 05/31/2023 at [facility] for bothersome POP/YUSRA/history of PUBOVAGINAL SLING  Procedure was uncomplicated.     No complaints    Patient reports:   Pain: No  Vaginal bleeding: No  Requiring narcotics: No  Fever/Chills: No  Nausea/Vomiting: No  Voiding: Yes  Constipation: No  Tolerating po: Yes  Ambulating: Yes  Back to daily activities: No  Vaginal bulge: No  Urinary incontinence: No    Pathology reviewed:   FINAL DIAGNOSIS       BLADDER BIOPSY SMALL POLYP:       MINUTE FRAGMENT OF BENIGN FIBROADIPOSE TISSUE.       NO UROTHELIAL MUCOSA IDENTIFIED.       MUSCULARIS PROPRIA (DETRUSOR MUSCLE) NOT IDENTIFIED.       POLYP, CLINICALLY.     Exam:  /86   Pulse 91   General: No acute distress   Skin: no lesions  Musculoskeletal: normal lower extremity strength  Sensation to light touch in the lower extremities is normal   Extremities: well perfused with normal pulses in the distal extremities, no peripheral edema noted  Abdominal exam: soft non tender, no palpable masses, no scar  External genitalia: normal female genitalia, no lesions, normal female hair distribution, no clitoral enlargement, nontender, no scars  Chaperone: Patricia Pino MA  Speculum exam:   Vagina: normal vagina, no discharge or lesions, sutures absent  BME: Non-tender, no masses, sutures absent  Ext: no edema. Ambulating well.  PVR 18 via U/S    Assessment: Carol Das is a 53 y.o. female s/p   CYSTOSCOPY, WITH PERIURETHRAL BULKING AGENT INJECTION (N/A)  URETHROLYSIS  INTRAOPERATIVE ULTRASOUND (N/A)  BIOPSY, BLADDER      on 05/31/2023 at [facility] for bothersome POP/YUSRA/h/o TOT sling   doing well and recovering appropriately 12 weeks post op     1. Post-operative  state    2. Stress incontinence    3. Female genital prolapse, unspecified type    4. Vaginal atrophy        Plan:     Patient doing well.    She continues to be able to urinate normally.  No leaking.  She can now stop mid-stream  Continue use of vaginal estrogen creme.    Thought she saw something in the mirror a few days ago.  Checked and no abnormalities noted.    Follow up isak Barrow MD  Pelvic Medicine and Reconstructive Surgery  Urogynecology  [facility]

## (undated) DEVICE — ELECTRODE PATIENT RETURN DISP

## (undated) DEVICE — DRAPE ORTH SPLIT 77X108IN

## (undated) DEVICE — SEE MEDLINE ITEM 146409

## (undated) DEVICE — GLOVE PROTEXIS PI SYN SURG 6.5

## (undated) DEVICE — DRAPE UND BUTT W/POUCH 40X44IN

## (undated) DEVICE — SUT CTD VICRYL 2.0

## (undated) DEVICE — SOL IRR WATER STRL 3000 ML

## (undated) DEVICE — NDL SYR 10ML 18X1.5 LL BLUNT

## (undated) DEVICE — Device

## (undated) DEVICE — COVER PROBE US 5.5X58L NON LTX

## (undated) DEVICE — KIT RETR PERI/GYN W/O STAYS

## (undated) DEVICE — PENCIL ROCKER SWITCH 10FT CORD

## (undated) DEVICE — NDL SAFETY 22G X 1.5 ECLIPSE

## (undated) DEVICE — CONTAINER SPECIMEN SCREW 4OZ

## (undated) DEVICE — TIP SUCTION YANKAUER

## (undated) DEVICE — NDL INJETAK ADJ TIP 70CM

## (undated) DEVICE — PAD PINK TRENDELENBURG POS XL

## (undated) DEVICE — DRESSING TELFA N ADH 3X8

## (undated) DEVICE — GLOVE PROTEXIS PI SYN SURG 7

## (undated) DEVICE — SUT VICRYL 3-0 27 SH

## (undated) DEVICE — TUBE SUCTION MEDI-VAC STERILE

## (undated) DEVICE — HOOK LONE STAR SHRP ELAS 5MM

## (undated) DEVICE — SOL IRRI STRL WATER 1000ML

## (undated) DEVICE — DRAPE TOP 53X102IN

## (undated) DEVICE — SYR 50ML CATH TIP